# Patient Record
Sex: FEMALE | Race: WHITE | NOT HISPANIC OR LATINO | Employment: FULL TIME | URBAN - METROPOLITAN AREA
[De-identification: names, ages, dates, MRNs, and addresses within clinical notes are randomized per-mention and may not be internally consistent; named-entity substitution may affect disease eponyms.]

---

## 2017-08-24 DIAGNOSIS — Z30.41 ORAL CONTRACEPTIVE PILL SURVEILLANCE: ICD-10-CM

## 2017-08-24 RX ORDER — LEVONORGESTREL AND ETHINYL ESTRADIOL AND ETHINYL ESTRADIOL 150-30(84)
KIT ORAL
Refills: 3 | OUTPATIENT
Start: 2017-08-24

## 2017-08-29 DIAGNOSIS — Z30.41 ORAL CONTRACEPTIVE PILL SURVEILLANCE: ICD-10-CM

## 2017-08-29 RX ORDER — LEVONORGESTREL AND ETHINYL ESTRADIOL AND ETHINYL ESTRADIOL 150-30(84)
KIT ORAL
Refills: 3 | OUTPATIENT
Start: 2017-08-29

## 2017-08-29 RX ORDER — LEVONORGESTREL / ETHINYL ESTRADIOL AND ETHINYL ESTRADIOL 150-30(84)
1 KIT ORAL DAILY
Qty: 91 EACH | Refills: 0 | Status: SHIPPED | OUTPATIENT
Start: 2017-08-29 | End: 2017-10-10 | Stop reason: SDUPTHER

## 2017-08-29 NOTE — TELEPHONE ENCOUNTER
Patient called to schedule an annual exam, so I will send in prescription for her birth control pills (1 pack only).

## 2017-10-10 ENCOUNTER — OFFICE VISIT (OUTPATIENT)
Dept: OBSTETRICS AND GYNECOLOGY | Facility: CLINIC | Age: 27
End: 2017-10-10

## 2017-10-10 VITALS
SYSTOLIC BLOOD PRESSURE: 110 MMHG | HEIGHT: 62 IN | WEIGHT: 144.8 LBS | DIASTOLIC BLOOD PRESSURE: 80 MMHG | BODY MASS INDEX: 26.65 KG/M2

## 2017-10-10 DIAGNOSIS — G43.709 CHRONIC MIGRAINE WITHOUT AURA WITHOUT STATUS MIGRAINOSUS, NOT INTRACTABLE: ICD-10-CM

## 2017-10-10 DIAGNOSIS — Z30.41 ORAL CONTRACEPTIVE PILL SURVEILLANCE: Primary | ICD-10-CM

## 2017-10-10 DIAGNOSIS — N60.12 FIBROCYSTIC BREAST CHANGES, BILATERAL: ICD-10-CM

## 2017-10-10 DIAGNOSIS — N60.11 FIBROCYSTIC BREAST CHANGES, BILATERAL: ICD-10-CM

## 2017-10-10 DIAGNOSIS — Z01.419 ENCOUNTER FOR GYNECOLOGICAL EXAMINATION WITHOUT ABNORMAL FINDING: ICD-10-CM

## 2017-10-10 PROCEDURE — 99395 PREV VISIT EST AGE 18-39: CPT | Performed by: OBSTETRICS & GYNECOLOGY

## 2017-10-10 RX ORDER — LEVONORGESTREL / ETHINYL ESTRADIOL AND ETHINYL ESTRADIOL 150-30(84)
1 KIT ORAL DAILY
Qty: 91 EACH | Refills: 3 | Status: SHIPPED | OUTPATIENT
Start: 2017-10-10 | End: 2018-10-10

## 2017-10-10 NOTE — PROGRESS NOTES
Chief Complaint   Patient presents with   • Gynecologic Exam   • Med Refill       nAette Linton is a 27 y.o. year old  presenting to be seen for her annual exam.This patient has a history of severe migraine headaches which are not intractable.  She is followed by Dr. Enrique Noland.  She takes Topamax, 100 mg daily.  She has been treated with Seasonique oral contraceptives and has menses every 3 months.  Dr. Noland is aware that the patient is on oral contraceptives.  She has had no worsening of her headaches.  She has no other side effects.  She has previously had a diagnostic laparoscopy with laser ablation of endometriosis 2 years ago.    SCREENING TESTS    Year 2012   Age                         PAP   Neg. Neg. Neg.                    HPV high risk                         Mammogram                         XOCHITL score                         Breast MRI                         Lipids                         Vitamin D                         Colonoscopy                         DEXA  Frax (hip/any)                         Ovarian Screen                             She exercises regularly: yes.  She wears her seat belt: yes.  She has concerns about domestic violence: no.  She has noticed changes in height: no    GYN screening history:  · Last pap: was done on approximately 2016 and the result was: normal PAP..    No Additional Complaints Reported    The following portions of the patient's history were reviewed and updated as appropriate:vital signs and   She  does not have any pertinent problems on file.  She  has a past surgical history that includes Pelvic laparoscopy.  Her family history includes Diabetes in her father; Hypertension in her father; Lung cancer in her maternal grandfather; Mitral valve prolapse in her brother and mother.  She  reports that she has never smoked. She has never used  "smokeless tobacco. She reports that she drinks alcohol. She reports that she does not use illicit drugs.  Current Outpatient Prescriptions   Medication Sig Dispense Refill   • Levonorgest-Eth Estrad 91-Day (SEASONIQUE) 0.15-0.03 &0.01 MG tablet Take 1 tablet by mouth Daily. 91 each 3   • topiramate (TOPAMAX) 100 MG tablet   0     No current facility-administered medications for this visit.      She has No Known Allergies..    Review of Systems  A comprehensive review of systems was taken.  Constitutional: negative for fever, chills, activity change, appetite change, fatigue and unexpected weight change. Migraine headaches.  Respiratory: negative  Cardiovascular: negative  Gastrointestinal: negative  Genitourinary:negative  Musculoskeletal:negative  Behavioral/Psych: negative       /80  Ht 62\" (157.5 cm)  Wt 144 lb 12.8 oz (65.7 kg)  LMP 09/12/2017 (Exact Date)  BMI 26.48 kg/m2    Physical Exam    General:  alert; cooperative; well developed; well nourished   Skin:  No suspicious lesions seen   Thyroid: normal to inspection and palpation   Lungs:  clear to auscultation bilaterally   Heart:  regular rate and rhythm, S1, S2 normal, no murmur, click, rub or gallop   Breasts:  Examined in supine position  Symmetric without masses or skin dimpling  Nipples normal without inversion, lesions or discharge  There are no palpable axillary nodes  Fibrocystic changes are present both breasts without a discrete mass   Abdomen: soft, non-tender; no masses  no umbilical or inginual hernias are present  no hepato-splenomegaly   Pelvis: Clinical staff was present for exam  External genitalia:  normal appearance of the external genitalia including Bartholin's and [place]'s glands.  Vaginal:  normal pink mucosa without prolapse or lesions.  Cervix:  normal appearance.  Uterus:  normal size, shape and consistency.  Adnexa:  normal bimanual exam of the adnexa.  Rectal:  anus visually normal appearing. recto-vaginal exam " unremarkable and confirms findings;     Lab Review   No data reviewed    Imaging  No data reviewed         ASSESSMENT  Problems Addressed this Visit        Cardiovascular and Mediastinum    Migraine       Other    Oral contraceptive pill surveillance - Primary    Relevant Medications    Levonorgest-Eth Estrad 91-Day (SEASONIQUE) 0.15-0.03 &0.01 MG tablet    Fibrocystic breast changes, bilateral      Other Visit Diagnoses     Encounter for gynecological examination without abnormal finding        Relevant Orders    Liquid-based Pap Smear, Screening - ThinPrep Vial, Cervix, Endocervix          PLAN    Medications prescribed this encounter:    New Medications Ordered This Visit   Medications   • Levonorgest-Eth Estrad 91-Day (SEASONIQUE) 0.15-0.03 &0.01 MG tablet     Sig: Take 1 tablet by mouth Daily.     Dispense:  91 each     Refill:  3   · The patient was cautioned to call us should her migraine headaches worsen or should she develop pelvic pain.  · Calcium, 600 mg/ Vit. D, 400 IU daily; regular weight-bearing exercise  · Follow up: 12 month(s)  *Please note that portions of this documentation may have been completed with a voice recognition program.  Efforts were made to edit this dictation, but occasional words may have been mistranscribed.       This note was electronically signed.    REMINGTON Obrien MD  October 10, 2017  10:05 AM

## 2019-01-29 ENCOUNTER — OFFICE VISIT (OUTPATIENT)
Dept: OBSTETRICS AND GYNECOLOGY | Facility: CLINIC | Age: 29
End: 2019-01-29

## 2019-01-29 VITALS
HEIGHT: 62 IN | DIASTOLIC BLOOD PRESSURE: 70 MMHG | WEIGHT: 152.2 LBS | BODY MASS INDEX: 28.01 KG/M2 | SYSTOLIC BLOOD PRESSURE: 108 MMHG

## 2019-01-29 DIAGNOSIS — N60.12 FIBROCYSTIC BREAST CHANGES, BILATERAL: ICD-10-CM

## 2019-01-29 DIAGNOSIS — N60.11 FIBROCYSTIC BREAST CHANGES, BILATERAL: ICD-10-CM

## 2019-01-29 DIAGNOSIS — Z01.419 ENCOUNTER FOR GYNECOLOGICAL EXAMINATION WITHOUT ABNORMAL FINDING: Primary | ICD-10-CM

## 2019-01-29 PROCEDURE — 99395 PREV VISIT EST AGE 18-39: CPT | Performed by: OBSTETRICS & GYNECOLOGY

## 2019-01-29 NOTE — PROGRESS NOTES
Chief Complaint   Patient presents with   • Gynecologic Exam   • Endometriosis     patient has gotten  and is concerned about getting pregnant.       Anette Linton is a 29 y.o. year old  presenting to be seen for her annual exam.  This patient has a history of endometriosis diagnosed at laparoscopy.  She was taking oral contraceptives but since her marriage she has discontinued the pill.  She is using no method of contraception.  She has concerns about being able to conceive.  She has mild to moderate dysmenorrhea and has some premenstrual edema.  She is taking a prenatal vitamin daily.  She denies bowel or urinary symptoms.    SCREENING TESTS    Year 2012   Age                         PAP      Neg.                   HPV high risk                         Mammogram                         XOCHITL score                         Breast MRI                         Lipids                         Vitamin D                         Colonoscopy                         DEXA  Frax (hip/any)                         Ovarian Screen                             She exercises regularly: yes.  She wears her seat belt: yes.  She has concerns about domestic violence: no.  She has noticed changes in height: no    GYN screening history:  · Last pap: was done on approximately 10/10/2017 and the result was: normal PAP..    No Additional Complaints Reported    The following portions of the patient's history were reviewed and updated as appropriate:vital signs and   She  has a past medical history of Endometriosis, Migraine, and Ovarian cyst.  She does not have any pertinent problems on file.  She  has a past surgical history that includes Pelvic laparoscopy.  Her family history includes Diabetes in her father; Hypertension in her father; Lung cancer in her maternal grandfather; Mitral valve prolapse in her brother and  "mother.  She  reports that  has never smoked. she has never used smokeless tobacco. She reports that she drinks alcohol. She reports that she does not use drugs.  No current outpatient medications on file.     No current facility-administered medications for this visit.      She has No Known Allergies..    Review of Systems  A comprehensive review of systems was taken.  Constitutional: negative for fever, chills, activity change, appetite change, fatigue and unexpected weight change.  Respiratory: negative  Cardiovascular: negative  Gastrointestinal: negative  Genitourinary:negative  Musculoskeletal:negative  Behavioral/Psych: negative       /70   Ht 157.5 cm (62\")   Wt 69 kg (152 lb 3.2 oz)   LMP 01/01/2019 (Approximate)   BMI 27.84 kg/m²     Physical Exam    General:  alert; cooperative; well developed; well nourished   Skin:  No suspicious lesions seen   Thyroid: normal to inspection and palpation   Lungs:  clear to auscultation bilaterally   Heart:  regular rate and rhythm, S1, S2 normal, no murmur, click, rub or gallop   Breasts:  Examined in supine position  Symmetric without masses or skin dimpling  Nipples normal without inversion, lesions or discharge  There are no palpable axillary nodes  Fibrocystic changes are present both breasts without a discrete mass   Abdomen: soft, non-tender; no masses  no umbilical or inguinal hernias are present  no hepato-splenomegaly   Pelvis: Clinical staff was present for exam  External genitalia:  normal appearance of the external genitalia including Bartholin's and [place]'s glands.  Vaginal:  normal pink mucosa without prolapse or lesions.  Cervix:  normal appearance.  Uterus:  normal size, shape and consistency. anteverted;  Adnexa:  normal bimanual exam of the adnexa.  Rectal:  anus visually normal appearing. recto-vaginal exam unremarkable and confirms findings;     Lab Review   No data reviewed    Imaging  No data reviewed         ASSESSMENT  Problems " Addressed this Visit        Other    Fibrocystic breast changes, bilateral      Other Visit Diagnoses     Encounter for gynecological examination without abnormal finding    -  Primary    Relevant Orders    Liquid-based Pap Smear, Screening          PLAN    · Medications prescribed this encounter:  No orders of the defined types were placed in this encounter.  · Monthly self breast assessment  · Regular weight-bearing exercise  · Prenatal vitamins daily  · I have instructed the patient that if she is unable to conceive after 6 months she should return and we will consider repeat laparoscopy and tubal chromotubation.  · Follow up: 12 month(s)  *Please note that portions of this documentation may have been completed with a voice recognition program.  Efforts were made to edit this dictation, but occasional words may have been mistranscribed.       This note was electronically signed.    REMINGTON Obrien MD  January 29, 2019  3:39 PM

## 2019-07-02 ENCOUNTER — OFFICE VISIT (OUTPATIENT)
Dept: OBSTETRICS AND GYNECOLOGY | Facility: CLINIC | Age: 29
End: 2019-07-02

## 2019-07-02 VITALS
HEIGHT: 62 IN | DIASTOLIC BLOOD PRESSURE: 74 MMHG | SYSTOLIC BLOOD PRESSURE: 106 MMHG | WEIGHT: 156.8 LBS | BODY MASS INDEX: 28.85 KG/M2

## 2019-07-02 DIAGNOSIS — N60.11 FIBROCYSTIC BREAST CHANGES, BILATERAL: Primary | ICD-10-CM

## 2019-07-02 DIAGNOSIS — N60.12 FIBROCYSTIC BREAST CHANGES, BILATERAL: Primary | ICD-10-CM

## 2019-07-02 DIAGNOSIS — N94.5 SECONDARY DYSMENORRHEA: ICD-10-CM

## 2019-07-02 DIAGNOSIS — N80.9 ENDOMETRIOSIS: ICD-10-CM

## 2019-07-02 PROCEDURE — 99213 OFFICE O/P EST LOW 20 MIN: CPT | Performed by: OBSTETRICS & GYNECOLOGY

## 2019-07-02 NOTE — PROGRESS NOTES
Chief Complaint   Patient presents with   • Pelvic Pain     bloating, nausea, SOB X 1 month.  pain improved with rest.       Anette Linton is a 29 y.o. year old  presenting to be seen because of increasing pelvic pain and dysmenorrhea.  This patient has been laparoscoped twice.  In 2016 she underwent a robotically-assisted laparoscopy with peritoneal biopsies and laser ablation of endometriosis.  She had endometrial implants of the peritoneum and ovaries as well as bilateral periovarian adhesions.  She had improvement after that time but since discontinuing her birth control pills in February has noted increasing dysmenorrhea, pelvic pain, bloating, and nausea.  Her last menstrual period began 2019 and was normal.  She denies bowel or urinary symptoms.  She does have some pelvic pain with exercise.    Social History     Substance and Sexual Activity   Sexual Activity Yes   • Partners: Male   • Birth control/protection: None     SCREENING TESTS    Year 2012   Age                         PAP       Neg. Neg.                 HPV high risk                         Mammogram                         XOCHITL score                         Breast MRI                         Lipids                         Vitamin D                         Colonoscopy                         DEXA  Frax (hip/any)                         Ovarian Screen                             No Additional Complaints Reported    The following portions of the patient's history were reviewed and updated as appropriate:vital signs and   She  has a past medical history of Endometriosis, Migraine, and Ovarian cyst.  She does not have any pertinent problems on file.  She  has a past surgical history that includes Pelvic laparoscopy.  Her family history includes Diabetes in her father; Hypertension in her father; Lung cancer in her maternal  "grandfather; Mitral valve prolapse in her brother and mother.  She  reports that she has never smoked. She has never used smokeless tobacco. She reports that she drinks alcohol. She reports that she does not use drugs.  No current outpatient medications on file.     No current facility-administered medications for this visit.      She has No Known Allergies..        Review of Systems  A comprehensive review of systems was done.  Constitutional: negative for fever, chills, activity change, appetite change, fatigue and unexpected weight change.  Respiratory: negative  Cardiovascular: negative  Gastrointestinal: negative  Genitourinary:positive for dysmenorrhea  Musculoskeletal:negative  Behavioral/Psych: negative          /74   Ht 157.5 cm (62\")   Wt 71.1 kg (156 lb 12.8 oz)   LMP 06/29/2019 (Exact Date)   Breastfeeding? No   BMI 28.68 kg/m²     Physical Exam    General:  alert; cooperative; well developed; well nourished   Skin:  No suspicious lesions seen   Thyroid: normal to inspection and palpation   Lungs:  clear to auscultation bilaterally   Heart:  regular rate and rhythm, S1, S2 normal, no murmur, click, rub or gallop   Breasts:  Not performed.   Abdomen: soft, non-tender; no masses  no umbilical or inguinal hernias are present  no hepato-splenomegaly   Pelvis: Clinical staff was present for exam  External genitalia:  normal appearance of the external genitalia including Bartholin's and [place]'s glands.  Vaginal:  normal pink mucosa without prolapse or lesions.  Cervix:  normal appearance.  Uterus:  normal size, shape and consistency. retroverted; tender  Adnexa:  normal bimanual exam of the adnexa.  Rectal:  anus visually normal appearing. recto-vaginal exam unremarkable and confirms findings;     Lab Review   No data reviewed    Imaging   No data reviewed    Medical counseling was given to patient for the following topics: diagnostic results, instructions for management, risk factor reductions, " prognosis, impressions and risks and benefits of treatment options . Total time of the encounter was 23 minute(s) and 14 minute(s)  was spent in face to face counseling.  I have counseled the patient that since her dysmenorrhea and pelvic pain has increased after discontinuing oral contraceptives, I would recommend that she have repeat laparoscopy since she will be attempting to conceive.  I have counseled the patient that she needs a follow-up pelvic ultrasound.  I have reviewed with her laparoscopy including laser ablation, lysis of adhesions, and chromotubation.  She has had no problems with her prior laparoscopies.  I have discussed with her the effects of endometriosis on fertility.  I have counseled her to continue taking a prenatal vitamin daily.          ASSESSMENT  Problems Addressed this Visit        Nervous and Auditory    Secondary dysmenorrhea       Other    Endometriosis    Relevant Orders    US Non-ob Transvaginal    Fibrocystic breast changes, bilateral - Primary            PLAN    · Medications prescribed this encounter:  No orders of the defined types were placed in this encounter.  · Return for pelvic ultrasound on 1/10/2019.  Continue taking prenatal vitamin daily  · Follow up: 8 day(s)  · PNV daily     *Please note that portions of this documentation may have been completed with a voice recognition program.  Efforts were made to edit this dictation, but occasional words may have been mistranscribed.     This note was electronically signed.    REMINGTON Obrien MD  July 2, 2019  10:21 AM

## 2019-07-10 ENCOUNTER — OFFICE VISIT (OUTPATIENT)
Dept: OBSTETRICS AND GYNECOLOGY | Facility: CLINIC | Age: 29
End: 2019-07-10

## 2019-07-10 VITALS
BODY MASS INDEX: 28.71 KG/M2 | WEIGHT: 156 LBS | HEIGHT: 62 IN | SYSTOLIC BLOOD PRESSURE: 106 MMHG | DIASTOLIC BLOOD PRESSURE: 74 MMHG

## 2019-07-10 DIAGNOSIS — N94.5 SECONDARY DYSMENORRHEA: Primary | ICD-10-CM

## 2019-07-10 DIAGNOSIS — N80.9 ENDOMETRIOSIS: ICD-10-CM

## 2019-07-10 PROCEDURE — 99213 OFFICE O/P EST LOW 20 MIN: CPT | Performed by: OBSTETRICS & GYNECOLOGY

## 2019-07-10 NOTE — PROGRESS NOTES
Chief Complaint   Patient presents with   • Results     pelvic ultrasound today       Anette Linton is a 29 y.o. year old  presenting to be seen because of follow-up for a history of endometriosis resulting in secondary dysmenorrhea.  In 2016 this patient underwent a robotically-assisted laparoscopy with biopsies of peritoneal and ovarian endometriosis as well as lysis of adhesions.  She was suppressed with oral contraceptives until February of this year and had relief of dysmenorrhea.  She discontinued oral contraceptives in 2019 and has resumed dysmenorrhea.  She is having a pelvic ultrasound today.  She denies bowel or urinary symptoms.  She is taking a prenatal vitamin daily.  She is attempting to conceive.    Social History     Substance and Sexual Activity   Sexual Activity Yes   • Partners: Male   • Birth control/protection: None     SCREENING TESTS    Year 2012   Age                         PAP        Neg.                 HPV high risk                         Mammogram                         XOCHITL score                         Breast MRI                         Lipids                         Vitamin D                         Colonoscopy                         DEXA  Frax (hip/any)                         Ovarian Screen                             No Additional Complaints Reported    The following portions of the patient's history were reviewed and updated as appropriate:vital signs and   She  has a past medical history of Endometriosis, Migraine, and Ovarian cyst.  She does not have any pertinent problems on file.  She  has a past surgical history that includes Pelvic laparoscopy.  Her family history includes Diabetes in her father; Hypertension in her father; Lung cancer in her maternal grandfather; Mitral valve prolapse in her brother and mother.  She  reports that she has never  "smoked. She has never used smokeless tobacco. She reports that she drinks alcohol. She reports that she does not use drugs.  No current outpatient medications on file.     No current facility-administered medications for this visit.      She has No Known Allergies..        Review of Systems  A comprehensive review of systems was done.  Constitutional: negative for fever, chills, activity change, appetite change, fatigue and unexpected weight change.  Respiratory: negative  Cardiovascular: negative  Gastrointestinal: negative  Genitourinary:negative  Musculoskeletal:negative  Behavioral/Psych: negative          /74   Ht 157.5 cm (62\")   Wt 70.8 kg (156 lb)   LMP 06/29/2019 (Exact Date)   BMI 28.53 kg/m²     Physical Exam    General:  alert; cooperative; well developed; well nourished   Skin:  Not performed.   Thyroid: normal to inspection and palpation   Lungs:  clear to auscultation bilaterally   Heart:  regular rate and rhythm, S1, S2 normal, no murmur, click, rub or gallop   Breasts:  Not performed.   Abdomen: soft, non-tender; no masses  no umbilical or inguinal hernias are present  no hepato-splenomegaly   Pelvis: Not performed.     Lab Review   Pap results- negative    Imaging   Pelvic ultrasound results-reveals a normal size anteverted uterus with an endometrial stripe of 8.5 mm.  The right ovary is normal.  There is a leading follicle in the left ovary.  This is consistent with pre-ovulation.    Medical counseling was given to patient and mother for the following topics: diagnostic results, instructions for management, prognosis, impressions and risks and benefits of treatment options . Total time of the encounter was 19 minute(s) and 17 minute(s)  was spent in face to face counseling.  I have counseled the patient that her pelvic ultrasound is normal and is consistent with ovulation.  I have counseled her that there is no evidence of an endometrioma.  I have counseled her that she may use " nonsteroidal anti-inflammatory agents for dysmenorrhea.  I have counseled her to decrease salt intake since she is having some swelling after her menses.  I have advised her that I would prefer not to use spironolactone.  I have counseled her to continue her prenatal vitamin daily.  I have counseled the patient to use an ovulation indicator kit.  I have counseled her about timed intercourse.          ASSESSMENT  Problems Addressed this Visit        Nervous and Auditory    Secondary dysmenorrhea - Primary       Other    Endometriosis            PLAN    · Medications prescribed this encounter:  No orders of the defined types were placed in this encounter.  · Use of ovulation indicator kit  · Continue prenatal vitamins daily  · If they are unable to conceive she will return in late October for laboratory evaluation and scheduling of a semen analysis.  · Follow up: 6 month(s)  · PNV daily     *Please note that portions of this documentation may have been completed with a voice recognition program.  Efforts were made to edit this dictation, but occasional words may have been mistranscribed.     This note was electronically signed.    REMINGTON Obrien MD  July 10, 2019  10:56 AM

## 2019-08-24 PROBLEM — Z34.00 SUPERVISION OF NORMAL FIRST PREGNANCY: Status: ACTIVE | Noted: 2019-08-24

## 2019-08-24 PROBLEM — Z01.419 WELL WOMAN EXAM: Status: ACTIVE | Noted: 2019-08-24

## 2019-10-15 ENCOUNTER — LAB REQUISITION (OUTPATIENT)
Dept: LAB | Facility: HOSPITAL | Age: 29
End: 2019-10-15

## 2019-10-15 DIAGNOSIS — Z00.00 ROUTINE GENERAL MEDICAL EXAMINATION AT A HEALTH CARE FACILITY: ICD-10-CM

## 2019-10-15 PROCEDURE — 36415 COLL VENOUS BLD VENIPUNCTURE: CPT | Performed by: OBSTETRICS & GYNECOLOGY

## 2019-10-21 ENCOUNTER — TRANSCRIBE ORDERS (OUTPATIENT)
Dept: WOMENS IMAGING | Facility: HOSPITAL | Age: 29
End: 2019-10-21

## 2019-10-21 DIAGNOSIS — O28.5 ABNORMAL CHROMOSOMAL AND GENETIC FINDING ON ANTENATAL SCREENING MOTHER: Primary | ICD-10-CM

## 2019-10-24 ENCOUNTER — HOSPITAL ENCOUNTER (OUTPATIENT)
Dept: WOMENS IMAGING | Facility: HOSPITAL | Age: 29
Discharge: HOME | End: 2019-10-24
Admitting: OBSTETRICS & GYNECOLOGY

## 2019-10-24 ENCOUNTER — OFFICE VISIT (OUTPATIENT)
Dept: OBSTETRICS AND GYNECOLOGY | Facility: HOSPITAL | Age: 29
End: 2019-10-24

## 2019-10-24 VITALS
BODY MASS INDEX: 30.62 KG/M2 | HEIGHT: 61 IN | WEIGHT: 162.2 LBS | DIASTOLIC BLOOD PRESSURE: 64 MMHG | SYSTOLIC BLOOD PRESSURE: 108 MMHG

## 2019-10-24 DIAGNOSIS — O28.5 ABNORMAL CHROMOSOMAL AND GENETIC FINDING ON ANTENATAL SCREENING MOTHER: ICD-10-CM

## 2019-10-24 DIAGNOSIS — Z34.90 PREGNANCY, UNSPECIFIED GESTATIONAL AGE: ICD-10-CM

## 2019-10-24 DIAGNOSIS — O28.0 ABNORMAL MSAFP (MATERNAL SERUM ALPHA-FETOPROTEIN), ELEVATED: Primary | ICD-10-CM

## 2019-10-24 DIAGNOSIS — O44.20 MARGINAL PLACENTA PREVIA: ICD-10-CM

## 2019-10-24 PROCEDURE — 76811 OB US DETAILED SNGL FETUS: CPT | Performed by: OBSTETRICS & GYNECOLOGY

## 2019-10-24 PROCEDURE — 76811 OB US DETAILED SNGL FETUS: CPT

## 2019-10-24 PROCEDURE — 99241 PR OFFICE CONSULTATION NEW/ESTAB PATIENT 15 MIN: CPT | Performed by: OBSTETRICS & GYNECOLOGY

## 2019-10-24 RX ORDER — PRENATAL WITH FERROUS FUM AND FOLIC ACID 3080; 920; 120; 400; 22; 1.84; 3; 20; 10; 1; 12; 200; 27; 25; 2 [IU]/1; [IU]/1; MG/1; [IU]/1; MG/1; MG/1; MG/1; MG/1; MG/1; MG/1; UG/1; MG/1; MG/1; MG/1; MG/1
1 TABLET ORAL DAILY
COMMUNITY
End: 2020-02-21 | Stop reason: HOSPADM

## 2020-01-03 ENCOUNTER — LAB REQUISITION (OUTPATIENT)
Dept: LAB | Facility: HOSPITAL | Age: 30
End: 2020-01-03

## 2020-01-03 DIAGNOSIS — Z00.00 ROUTINE GENERAL MEDICAL EXAMINATION AT A HEALTH CARE FACILITY: ICD-10-CM

## 2020-01-03 PROCEDURE — 36415 COLL VENOUS BLD VENIPUNCTURE: CPT | Performed by: NURSE PRACTITIONER

## 2020-02-06 ENCOUNTER — APPOINTMENT (OUTPATIENT)
Dept: WOMENS IMAGING | Facility: HOSPITAL | Age: 30
End: 2020-02-06

## 2020-02-11 ENCOUNTER — LAB REQUISITION (OUTPATIENT)
Dept: LAB | Facility: HOSPITAL | Age: 30
End: 2020-02-11

## 2020-02-11 DIAGNOSIS — Z00.00 ROUTINE GENERAL MEDICAL EXAMINATION AT A HEALTH CARE FACILITY: ICD-10-CM

## 2020-02-11 PROCEDURE — 36415 COLL VENOUS BLD VENIPUNCTURE: CPT | Performed by: NURSE PRACTITIONER

## 2020-02-14 ENCOUNTER — LAB (OUTPATIENT)
Dept: LAB | Facility: HOSPITAL | Age: 30
End: 2020-02-14

## 2020-02-14 ENCOUNTER — HOSPITAL ENCOUNTER (INPATIENT)
Facility: HOSPITAL | Age: 30
LOS: 7 days | Discharge: HOME | End: 2020-02-21
Attending: OBSTETRICS & GYNECOLOGY | Admitting: OBSTETRICS & GYNECOLOGY

## 2020-02-14 ENCOUNTER — TRANSCRIBE ORDERS (OUTPATIENT)
Dept: LAB | Facility: HOSPITAL | Age: 30
End: 2020-02-14

## 2020-02-14 DIAGNOSIS — Z36.0 SCREENING FOR CHROMOSOMAL ANOMALIES BY AMNIOCENTESIS: ICD-10-CM

## 2020-02-14 DIAGNOSIS — Z34.90 PREGNANCY, UNSPECIFIED GESTATIONAL AGE: Primary | ICD-10-CM

## 2020-02-14 DIAGNOSIS — O14.93 PRE-ECLAMPSIA IN THIRD TRIMESTER: Primary | ICD-10-CM

## 2020-02-14 PROBLEM — O14.90 PREECLAMPSIA: Status: ACTIVE | Noted: 2020-02-14

## 2020-02-14 LAB
ABO GROUP BLD: NORMAL
ALP SERPL-CCNC: 163 U/L (ref 39–117)
ALT SERPL W P-5'-P-CCNC: 23 U/L (ref 1–33)
AST SERPL-CCNC: 29 U/L (ref 1–32)
BILIRUB SERPL-MCNC: 0.3 MG/DL (ref 0.2–1.2)
BLD GP AB SCN SERPL QL: NEGATIVE
CREAT BLD-MCNC: 0.72 MG/DL (ref 0.57–1)
CREAT UR-MCNC: 55.5 MG/DL
DEPRECATED RDW RBC AUTO: 39.9 FL (ref 37–54)
ERYTHROCYTE [DISTWIDTH] IN BLOOD BY AUTOMATED COUNT: 12 % (ref 12.3–15.4)
HCT VFR BLD AUTO: 37 % (ref 34–46.6)
HGB BLD-MCNC: 12.9 G/DL (ref 12–15.9)
LDH SERPL-CCNC: 273 U/L (ref 135–214)
MCH RBC QN AUTO: 32 PG (ref 26.6–33)
MCHC RBC AUTO-ENTMCNC: 34.9 G/DL (ref 31.5–35.7)
MCV RBC AUTO: 91.8 FL (ref 79–97)
PLATELET # BLD AUTO: 267 10*3/MM3 (ref 140–450)
PMV BLD AUTO: 10.8 FL (ref 6–12)
PROT 24H UR-MRATE: 3177.5 MG/24HOURS (ref 0–150)
PROT UR-MCNC: 149 MG/DL
PROT/CREAT UR: 2684.7 MG/G CREA (ref 0–200)
RBC # BLD AUTO: 4.03 10*6/MM3 (ref 3.77–5.28)
RH BLD: POSITIVE
T&S EXPIRATION DATE: NORMAL
URATE SERPL-MCNC: 5 MG/DL (ref 2.4–5.7)
WBC NRBC COR # BLD: 13.9 10*3/MM3 (ref 3.4–10.8)

## 2020-02-14 PROCEDURE — 86850 RBC ANTIBODY SCREEN: CPT | Performed by: NURSE PRACTITIONER

## 2020-02-14 PROCEDURE — 84450 TRANSFERASE (AST) (SGOT): CPT | Performed by: NURSE PRACTITIONER

## 2020-02-14 PROCEDURE — 59025 FETAL NON-STRESS TEST: CPT

## 2020-02-14 PROCEDURE — 82565 ASSAY OF CREATININE: CPT | Performed by: NURSE PRACTITIONER

## 2020-02-14 PROCEDURE — 86900 BLOOD TYPING SEROLOGIC ABO: CPT | Performed by: NURSE PRACTITIONER

## 2020-02-14 PROCEDURE — 82570 ASSAY OF URINE CREATININE: CPT

## 2020-02-14 PROCEDURE — 84156 ASSAY OF PROTEIN URINE: CPT

## 2020-02-14 PROCEDURE — 84550 ASSAY OF BLOOD/URIC ACID: CPT | Performed by: NURSE PRACTITIONER

## 2020-02-14 PROCEDURE — 86900 BLOOD TYPING SEROLOGIC ABO: CPT

## 2020-02-14 PROCEDURE — 86901 BLOOD TYPING SEROLOGIC RH(D): CPT | Performed by: NURSE PRACTITIONER

## 2020-02-14 PROCEDURE — 82247 BILIRUBIN TOTAL: CPT | Performed by: NURSE PRACTITIONER

## 2020-02-14 PROCEDURE — 25010000002 BETAMETHASONE ACET & SOD PHOS PER 4 MG: Performed by: NURSE PRACTITIONER

## 2020-02-14 PROCEDURE — 36415 COLL VENOUS BLD VENIPUNCTURE: CPT | Performed by: NURSE PRACTITIONER

## 2020-02-14 PROCEDURE — 87081 CULTURE SCREEN ONLY: CPT

## 2020-02-14 PROCEDURE — 86901 BLOOD TYPING SEROLOGIC RH(D): CPT

## 2020-02-14 PROCEDURE — 83615 LACTATE (LD) (LDH) ENZYME: CPT | Performed by: NURSE PRACTITIONER

## 2020-02-14 PROCEDURE — 85027 COMPLETE CBC AUTOMATED: CPT | Performed by: NURSE PRACTITIONER

## 2020-02-14 PROCEDURE — 81050 URINALYSIS VOLUME MEASURE: CPT | Performed by: NURSE PRACTITIONER

## 2020-02-14 PROCEDURE — 84075 ASSAY ALKALINE PHOSPHATASE: CPT | Performed by: NURSE PRACTITIONER

## 2020-02-14 PROCEDURE — 84156 ASSAY OF PROTEIN URINE: CPT | Performed by: NURSE PRACTITIONER

## 2020-02-14 PROCEDURE — 84460 ALANINE AMINO (ALT) (SGPT): CPT | Performed by: NURSE PRACTITIONER

## 2020-02-14 RX ORDER — BETAMETHASONE SODIUM PHOSPHATE AND BETAMETHASONE ACETATE 3; 3 MG/ML; MG/ML
12 INJECTION, SUSPENSION INTRA-ARTICULAR; INTRALESIONAL; INTRAMUSCULAR; SOFT TISSUE EVERY 24 HOURS
Status: COMPLETED | OUTPATIENT
Start: 2020-02-14 | End: 2020-02-15

## 2020-02-14 RX ORDER — ONDANSETRON 4 MG/1
4 TABLET, FILM COATED ORAL EVERY 8 HOURS PRN
Status: DISCONTINUED | OUTPATIENT
Start: 2020-02-14 | End: 2020-02-18 | Stop reason: SDUPTHER

## 2020-02-14 RX ORDER — ACETAMINOPHEN 325 MG/1
650 TABLET ORAL EVERY 4 HOURS PRN
Status: DISCONTINUED | OUTPATIENT
Start: 2020-02-14 | End: 2020-02-21 | Stop reason: HOSPADM

## 2020-02-14 RX ADMIN — BETAMETHASONE SODIUM PHOSPHATE AND BETAMETHASONE ACETATE 12 MG: 3; 3 INJECTION, SUSPENSION INTRA-ARTICULAR; INTRALESIONAL; INTRAMUSCULAR at 15:58

## 2020-02-15 LAB
ALP SERPL-CCNC: 134 U/L (ref 39–117)
ALT SERPL W P-5'-P-CCNC: 18 U/L (ref 1–33)
AST SERPL-CCNC: 22 U/L (ref 1–32)
BILIRUB SERPL-MCNC: <0.2 MG/DL (ref 0.2–1.2)
CREAT BLD-MCNC: 0.59 MG/DL (ref 0.57–1)
DEPRECATED RDW RBC AUTO: 39.5 FL (ref 37–54)
ERYTHROCYTE [DISTWIDTH] IN BLOOD BY AUTOMATED COUNT: 12 % (ref 12.3–15.4)
HCT VFR BLD AUTO: 34.8 % (ref 34–46.6)
HGB BLD-MCNC: 11.9 G/DL (ref 12–15.9)
LDH SERPL-CCNC: 245 U/L (ref 135–214)
MCH RBC QN AUTO: 31.2 PG (ref 26.6–33)
MCHC RBC AUTO-ENTMCNC: 34.2 G/DL (ref 31.5–35.7)
MCV RBC AUTO: 91.1 FL (ref 79–97)
PLATELET # BLD AUTO: 238 10*3/MM3 (ref 140–450)
PMV BLD AUTO: 11.4 FL (ref 6–12)
RBC # BLD AUTO: 3.82 10*6/MM3 (ref 3.77–5.28)
URATE SERPL-MCNC: 4.9 MG/DL (ref 2.4–5.7)
WBC NRBC COR # BLD: 17.76 10*3/MM3 (ref 3.4–10.8)

## 2020-02-15 PROCEDURE — 25010000002 BETAMETHASONE ACET & SOD PHOS PER 4 MG: Performed by: NURSE PRACTITIONER

## 2020-02-15 PROCEDURE — 85027 COMPLETE CBC AUTOMATED: CPT | Performed by: OBSTETRICS & GYNECOLOGY

## 2020-02-15 PROCEDURE — 82565 ASSAY OF CREATININE: CPT | Performed by: OBSTETRICS & GYNECOLOGY

## 2020-02-15 PROCEDURE — 84460 ALANINE AMINO (ALT) (SGPT): CPT | Performed by: OBSTETRICS & GYNECOLOGY

## 2020-02-15 PROCEDURE — 59025 FETAL NON-STRESS TEST: CPT

## 2020-02-15 PROCEDURE — 84450 TRANSFERASE (AST) (SGOT): CPT | Performed by: OBSTETRICS & GYNECOLOGY

## 2020-02-15 PROCEDURE — 82247 BILIRUBIN TOTAL: CPT | Performed by: OBSTETRICS & GYNECOLOGY

## 2020-02-15 PROCEDURE — 83615 LACTATE (LD) (LDH) ENZYME: CPT | Performed by: OBSTETRICS & GYNECOLOGY

## 2020-02-15 PROCEDURE — 84550 ASSAY OF BLOOD/URIC ACID: CPT | Performed by: OBSTETRICS & GYNECOLOGY

## 2020-02-15 PROCEDURE — 84075 ASSAY ALKALINE PHOSPHATASE: CPT | Performed by: OBSTETRICS & GYNECOLOGY

## 2020-02-15 RX ORDER — FAMOTIDINE 20 MG/1
20 TABLET, FILM COATED ORAL
Status: DISCONTINUED | OUTPATIENT
Start: 2020-02-15 | End: 2020-02-21 | Stop reason: HOSPADM

## 2020-02-15 RX ORDER — FAMOTIDINE 20 MG/1
20 TABLET, FILM COATED ORAL
Status: DISCONTINUED | OUTPATIENT
Start: 2020-02-15 | End: 2020-02-15

## 2020-02-15 RX ADMIN — BETAMETHASONE SODIUM PHOSPHATE AND BETAMETHASONE ACETATE 12 MG: 3; 3 INJECTION, SUSPENSION INTRA-ARTICULAR; INTRALESIONAL; INTRAMUSCULAR at 15:14

## 2020-02-15 RX ADMIN — ACETAMINOPHEN 650 MG: 325 TABLET ORAL at 02:47

## 2020-02-15 RX ADMIN — FAMOTIDINE 20 MG: 20 TABLET ORAL at 01:54

## 2020-02-15 NOTE — PROGRESS NOTES
Daily Progress Note    Patient name: Anette Linton  YOB: 1990   MRN: [id number]  Admission Date: 2020  Date of Service: 2/15/2020  Referring Provider: Dariel Aragon MD    Anette Linton is a 30 y.o.    at 33w1d  admitted on 2020 for preeclampsia    Hospital day 1      Diagnoses:   Patient Active Problem List    Diagnosis   • Preeclampsia [O14.90]   • Abnormal MSAFP (maternal serum alpha-fetoprotein), elevated [O28.0]   • Pregnancy [Z34.90]   • Marginal placenta previa [O44.20]   • Annual GYN exam w/o problems [Z01.419]   • Supervision of normal first pregnancy [Z34.00]   • Migraine [G43.909]       Chief Complaint:  Chief Complaint   Patient presents with   • Hypertension-Pregnant       Subjective:      Anette has no complaints today and denies headache, vision change, SOA, or abdominal pain. BP has been normal overnight.   Reports fetal movement is normal  Denies leakage of amniotic fluid.  Denies vaginal bleeding    Objective:     Vital signs:  Temp:  [98.4 °F (36.9 °C)-99.2 °F (37.3 °C)] 98.4 °F (36.9 °C)  Heart Rate:  [62-86] 75  Resp:  [16-18] 18  BP: (126-152)/(73-94) 133/89    Abdomen: soft, nontender  Uterus: gravid, nontender  Extremities: nontender; no edema        Non-Stress Test:    Fetal Heart Rate Assessment   Method: Fetal HR Assessment Method: external   Beats/min: Fetal HR (beats/min): 120   Baseline: Fetal Heart Baseline Rate: normal range   Variability: Fetal HR Variability: moderate (amplitude range 6 to 25 bpm)   Accels: Fetal HR Accelerations: greater than/equal to 15 bpm, lasting at least 15 seconds   Decels: Fetal HR Decelerations: absent   Tracing Category:       Uterine Assessment   Method: Method: external tocotransducer   Frequency (min): Contraction Frequency (Minutes): x3   Ctx Count in 10 min:     Duration:     Intensity:     Intensity by IUPC:     Resting Tone: Uterine Resting Tone: soft by palpation(no ctx)   Resting Tone by IUPC:     [place] Units:        Cervix: Exam by:     Dilation:     Effacement:     Station:         Most recent ultrasound:      Medications:    betamethasone acetate-betamethasone sodium phosphate 12 mg Intramuscular Q24H   famotidine 20 mg Oral BID AC      •  acetaminophen  •  ondansetron    Labs:  Lab Results (last 24 hours)     Procedure Component Value Units Date/Time    Preeclampsia Panel [[id number]]  (Abnormal) Collected:  02/15/20 0512    Specimen:  Blood Updated:  02/15/20 0734     Alkaline Phosphatase 134 U/L      ALT (SGPT) 18 U/L      AST (SGOT) 22 U/L      Creatinine 0.59 mg/dL      Total Bilirubin <0.2 mg/dL       U/L      Uric Acid 4.9 mg/dL     CBC (No Diff) [[id number]]  (Abnormal) Collected:  02/15/20 0512    Specimen:  Blood Updated:  02/15/20 0623     WBC 17.76 10*3/mm3      RBC 3.82 10*6/mm3      Hemoglobin 11.9 g/dL      Hematocrit 34.8 %      MCV 91.1 fL      MCH 31.2 pg      MCHC 34.2 g/dL      RDW 12.0 %      RDW-SD 39.5 fl      MPV 11.4 fL      Platelets 238 10*3/mm3     Preeclampsia Panel [[id number]]  (Abnormal) Collected:  20    Specimen:  Blood Updated:  20 145     Alkaline Phosphatase 163 U/L      ALT (SGPT) 23 U/L      AST (SGOT) 29 U/L      Creatinine 0.72 mg/dL      Total Bilirubin 0.3 mg/dL       U/L      Uric Acid 5.0 mg/dL     CBC (No Diff) [[id number]]  (Abnormal) Collected:  20    Specimen:  Blood Updated:  20 142     WBC 13.90 10*3/mm3      RBC 4.03 10*6/mm3      Hemoglobin 12.9 g/dL      Hematocrit 37.0 %      MCV 91.8 fL      MCH 32.0 pg      MCHC 34.9 g/dL      RDW 12.0 %      RDW-SD 39.9 fl      MPV 10.8 fL      Platelets 267 10*3/mm3         Lab Results   Component Value Date    HGB 11.9 (L) 02/15/2020         Assessment/Plan:      Anette is a 30 y.o.    at 33w1d.  1. Preeclampsia- Will continue steroids and observation.  Plan for PDC scan on Monday with possible delivery.    .  All questions were answered to the best my ability.    Kwan Noland  MD Randy  2/15/2020

## 2020-02-15 NOTE — H&P
"History and Physical  [facility]    Chief Complaint   Patient presents with   • Hypertension-Pregnant       Patient Active Problem List   Diagnosis   • Migraine   • Annual GYN exam w/o problems   • Supervision of normal first pregnancy   • Abnormal MSAFP (maternal serum alpha-fetoprotein), elevated   • Pregnancy   • Marginal placenta previa   • Preeclampsia       Anette Linton is a 30 y.o. year old  with an Estimated Date of Delivery: 4/3/20 currently at 33w0d presenting with swelling and elevated blood pressure.     Prenatal care has been with Dr. Aragon.  It has been significant for swelling and elevated blood pressure. She was seen by Nurse Practitioner on Tuesday and started a 24 hour urine protein and labs. Labs were normal, but 3g of protein in urine. Currently she is otherwise without complaints.     No Additional Complaints Reported    The following portions of the patient's history were reviewed and updated as appropriate:vital signs, allergies, current medications, past medical history, past social history, past surgical history and problem list.    Review of Systems  Pertinent items are noted in HPI.     Objective     /84   Pulse 82   Temp 98.4 °F (36.9 °C) (Oral)   Resp 18   Ht 154.9 cm (61\")   Wt 91.6 kg (202 lb)   BMI 38.17 kg/m²     Physical Exam    General:  well developed; well nourished  no acute distress           Abdomen: soft, non-tender; no masses  no umbilical or inguinal hernias are present  no hepato-splenomegaly       FHT's: reactive and category 1   Cervix:    Labette:      Lab Review   Labs: CBC   Lab Results (last 24 hours)     Procedure Component Value Units Date/Time    Preeclampsia Panel [[id number]]  (Abnormal) Collected:  20 1417    Specimen:  Blood Updated:  20 1456     Alkaline Phosphatase 163 U/L      ALT (SGPT) 23 U/L      AST (SGOT) 29 U/L      Creatinine 0.72 mg/dL      Total Bilirubin 0.3 mg/dL       U/L      Uric Acid 5.0 " mg/dL     CBC (No Diff) [[id number]]  (Abnormal) Collected:  02/14/20 1417    Specimen:  Blood Updated:  02/14/20 1427     WBC 13.90 10*3/mm3      RBC 4.03 10*6/mm3      Hemoglobin 12.9 g/dL      Hematocrit 37.0 %      MCV 91.8 fL      MCH 32.0 pg      MCHC 34.9 g/dL      RDW 12.0 %      RDW-SD 39.9 fl      MPV 10.8 fL      Platelets 267 10*3/mm3           Imaging   No data reviewed   Imaging Results (Most Recent)     None        Assessment/Plan     ASSESSMENT  1. IUP at 33w0d  2. Preeclampsia  PLAN  1. Admit for steroids and monitoring of blood pressure. BP have been normal so far since admission.   2. Continue twice daily NSTs.          Kwan Padilla MD  2/14/20209:12 PM

## 2020-02-16 PROCEDURE — 59025 FETAL NON-STRESS TEST: CPT

## 2020-02-16 RX ADMIN — FAMOTIDINE 20 MG: 20 TABLET ORAL at 00:02

## 2020-02-16 RX ADMIN — FAMOTIDINE 20 MG: 20 TABLET ORAL at 07:58

## 2020-02-16 RX ADMIN — FAMOTIDINE 20 MG: 20 TABLET ORAL at 17:27

## 2020-02-16 NOTE — PROGRESS NOTES
Daily Progress Note    Patient name: Anette Linton  YOB: 1990   MRN: [id number]  Admission Date: 2020  Date of Service: 2020  Referring Provider: Dariel Aragon MD    Anette Linton is a 30 y.o.    at 33w2d  admitted on 2020 for preeclampsia    Hospital day 2      Diagnoses:   Patient Active Problem List    Diagnosis   • Preeclampsia [O14.90]   • Abnormal MSAFP (maternal serum alpha-fetoprotein), elevated [O28.0]   • Pregnancy [Z34.90]   • Marginal placenta previa [O44.20]   • Annual GYN exam w/o problems [Z01.419]   • Supervision of normal first pregnancy [Z34.00]   • Migraine [G43.909]       Chief Complaint:  Chief Complaint   Patient presents with   • Hypertension-Pregnant       Subjective:      Anette has no complaints today and is feeling well. Normal BP overnight.   Reports fetal movement is normal  Denies leakage of amniotic fluid.  Denies vaginal bleeding    Objective:     Vital signs:  Temp:  [98 °F (36.7 °C)-98.4 °F (36.9 °C)] 98 °F (36.7 °C)  Heart Rate:  [68-87] 87  Resp:  [16-18] 16  BP: (109-144)/(63-79) 109/63    Abdomen: soft, nontender  Uterus: gravid, nontender  Extremities: nontender; no edema        Non-Stress Test:    Fetal Heart Rate Assessment   Method: Fetal HR Assessment Method: external   Beats/min: Fetal HR (beats/min): 120   Baseline: Fetal Heart Baseline Rate: normal range   Variability: Fetal HR Variability: moderate (amplitude range 6 to 25 bpm)   Accels: Fetal HR Accelerations: greater than/equal to 15 bpm, lasting at least 15 seconds   Decels: Fetal HR Decelerations: absent   Tracing Category:       Uterine Assessment   Method: Method: external tocotransducer   Frequency (min): Contraction Frequency (Minutes): x3   Ctx Count in 10 min:     Duration:     Intensity: Contraction Intensity: no contractions   Intensity by IUPC:     Resting Tone: Uterine Resting Tone: soft by palpation(no ctx)   Resting Tone by IUPC:     Sunflower Units:       Cervix: Exam  by:     Dilation:     Effacement:     Station:         Most recent ultrasound:    Medications:    famotidine 20 mg Oral BID AC      •  acetaminophen  •  ondansetron    Labs:  Lab Results (last 24 hours)     ** No results found for the last 24 hours. **        Lab Results   Component Value Date    HGB 11.9 (L) 02/15/2020         Assessment/Plan:      Anette is a 30 y.o.    at 33w2d.  1. Preclampsia with borderline BP and 3g proteinuria  2. Fetal: NSTs bid; PDC scan in the morning with MFM consult.   .  All questions were answered to the best my ability.    Kwan Padilla MD  2020

## 2020-02-17 ENCOUNTER — APPOINTMENT (OUTPATIENT)
Dept: WOMENS IMAGING | Facility: HOSPITAL | Age: 30
End: 2020-02-17

## 2020-02-17 LAB
ALP SERPL-CCNC: 137 U/L (ref 39–117)
ALT SERPL W P-5'-P-CCNC: 28 U/L (ref 1–33)
AST SERPL-CCNC: 31 U/L (ref 1–32)
BACTERIA SPEC AEROBE CULT: NORMAL
BASOPHILS # BLD AUTO: 0.06 10*3/MM3 (ref 0–0.2)
BASOPHILS NFR BLD AUTO: 0.4 % (ref 0–1.5)
BILIRUB SERPL-MCNC: <0.2 MG/DL (ref 0.2–1.2)
CREAT BLD-MCNC: 0.73 MG/DL (ref 0.57–1)
DEPRECATED RDW RBC AUTO: 41.9 FL (ref 37–54)
EOSINOPHIL # BLD AUTO: 0.01 10*3/MM3 (ref 0–0.4)
EOSINOPHIL NFR BLD AUTO: 0.1 % (ref 0.3–6.2)
ERYTHROCYTE [DISTWIDTH] IN BLOOD BY AUTOMATED COUNT: 12.2 % (ref 12.3–15.4)
HCT VFR BLD AUTO: 38.7 % (ref 34–46.6)
HGB BLD-MCNC: 13.1 G/DL (ref 12–15.9)
IMM GRANULOCYTES # BLD AUTO: 0.72 10*3/MM3 (ref 0–0.05)
IMM GRANULOCYTES NFR BLD AUTO: 4.2 % (ref 0–0.5)
LDH SERPL-CCNC: 259 U/L (ref 135–214)
LYMPHOCYTES # BLD AUTO: 1.82 10*3/MM3 (ref 0.7–3.1)
LYMPHOCYTES NFR BLD AUTO: 10.7 % (ref 19.6–45.3)
MCH RBC QN AUTO: 32.1 PG (ref 26.6–33)
MCHC RBC AUTO-ENTMCNC: 33.9 G/DL (ref 31.5–35.7)
MCV RBC AUTO: 94.9 FL (ref 79–97)
MONOCYTES # BLD AUTO: 1.27 10*3/MM3 (ref 0.1–0.9)
MONOCYTES NFR BLD AUTO: 7.5 % (ref 5–12)
NEUTROPHILS # BLD AUTO: 13.08 10*3/MM3 (ref 1.7–7)
NEUTROPHILS NFR BLD AUTO: 77.1 % (ref 42.7–76)
NRBC BLD AUTO-RTO: 0.3 /100 WBC (ref 0–0.2)
PLATELET # BLD AUTO: 270 10*3/MM3 (ref 140–450)
PMV BLD AUTO: 10.9 FL (ref 6–12)
RBC # BLD AUTO: 4.08 10*6/MM3 (ref 3.77–5.28)
URATE SERPL-MCNC: 6.1 MG/DL (ref 2.4–5.7)
WBC NRBC COR # BLD: 16.96 10*3/MM3 (ref 3.4–10.8)

## 2020-02-17 PROCEDURE — 76816 OB US FOLLOW-UP PER FETUS: CPT | Performed by: OBSTETRICS & GYNECOLOGY

## 2020-02-17 PROCEDURE — 76819 FETAL BIOPHYS PROFIL W/O NST: CPT

## 2020-02-17 PROCEDURE — 83615 LACTATE (LD) (LDH) ENZYME: CPT | Performed by: OBSTETRICS & GYNECOLOGY

## 2020-02-17 PROCEDURE — 82565 ASSAY OF CREATININE: CPT | Performed by: OBSTETRICS & GYNECOLOGY

## 2020-02-17 PROCEDURE — 59025 FETAL NON-STRESS TEST: CPT

## 2020-02-17 PROCEDURE — 84550 ASSAY OF BLOOD/URIC ACID: CPT | Performed by: OBSTETRICS & GYNECOLOGY

## 2020-02-17 PROCEDURE — 76819 FETAL BIOPHYS PROFIL W/O NST: CPT | Performed by: OBSTETRICS & GYNECOLOGY

## 2020-02-17 PROCEDURE — 82247 BILIRUBIN TOTAL: CPT | Performed by: OBSTETRICS & GYNECOLOGY

## 2020-02-17 PROCEDURE — 76816 OB US FOLLOW-UP PER FETUS: CPT

## 2020-02-17 PROCEDURE — 25010000002 MAGNESIUM SULFATE PER 500 MG OF MAGNESIUM: Performed by: OBSTETRICS & GYNECOLOGY

## 2020-02-17 PROCEDURE — 84460 ALANINE AMINO (ALT) (SGPT): CPT | Performed by: OBSTETRICS & GYNECOLOGY

## 2020-02-17 PROCEDURE — 84450 TRANSFERASE (AST) (SGOT): CPT | Performed by: OBSTETRICS & GYNECOLOGY

## 2020-02-17 PROCEDURE — 84075 ASSAY ALKALINE PHOSPHATASE: CPT | Performed by: OBSTETRICS & GYNECOLOGY

## 2020-02-17 PROCEDURE — 85025 COMPLETE CBC W/AUTO DIFF WBC: CPT | Performed by: OBSTETRICS & GYNECOLOGY

## 2020-02-17 RX ORDER — LABETALOL HYDROCHLORIDE 5 MG/ML
20-80 INJECTION, SOLUTION INTRAVENOUS
Status: DISPENSED | OUTPATIENT
Start: 2020-02-17 | End: 2020-02-18

## 2020-02-17 RX ORDER — SODIUM CHLORIDE 0.9 % (FLUSH) 0.9 %
10 SYRINGE (ML) INJECTION EVERY 12 HOURS SCHEDULED
Status: DISCONTINUED | OUTPATIENT
Start: 2020-02-17 | End: 2020-02-21 | Stop reason: HOSPADM

## 2020-02-17 RX ORDER — SODIUM CHLORIDE 0.9 % (FLUSH) 0.9 %
10 SYRINGE (ML) INJECTION AS NEEDED
Status: DISCONTINUED | OUTPATIENT
Start: 2020-02-17 | End: 2020-02-21 | Stop reason: HOSPADM

## 2020-02-17 RX ORDER — DEXTROSE AND SODIUM CHLORIDE 5; .2 G/100ML; G/100ML
75 INJECTION, SOLUTION INTRAVENOUS CONTINUOUS
Status: DISCONTINUED | OUTPATIENT
Start: 2020-02-17 | End: 2020-02-21 | Stop reason: HOSPADM

## 2020-02-17 RX ORDER — MAGNESIUM SULF/RINGERS LACTATE 20 G/290ML
2 PLASTIC BAG, INJECTION (ML) INTRAVENOUS CONTINUOUS
Status: DISPENSED | OUTPATIENT
Start: 2020-02-17 | End: 2020-02-20

## 2020-02-17 RX ADMIN — FAMOTIDINE 20 MG: 20 TABLET ORAL at 18:05

## 2020-02-17 RX ADMIN — LABETALOL 20 MG/4 ML (5 MG/ML) INTRAVENOUS SYRINGE 20 MG: at 22:43

## 2020-02-17 RX ADMIN — ACETAMINOPHEN 650 MG: 325 TABLET ORAL at 23:39

## 2020-02-17 RX ADMIN — ACETAMINOPHEN 650 MG: 325 TABLET ORAL at 05:24

## 2020-02-17 RX ADMIN — Medication 2 G/HR: at 17:15

## 2020-02-17 RX ADMIN — DEXTROSE AND SODIUM CHLORIDE 75 ML/HR: 5; 200 INJECTION, SOLUTION INTRAVENOUS at 17:07

## 2020-02-17 RX ADMIN — FAMOTIDINE 20 MG: 20 TABLET ORAL at 08:25

## 2020-02-17 NOTE — POST-PROCEDURE NOTE
Documentation of the ultasound findings, images, and interpretations with be available in the patient's Viewpoint report located in the Chart Review Imaging tab in EPIC.    Brief Ultrasound Note Mendez    Single viable intrauterine pregnancy in the cephalic presentation; although actually compound presentation as a feet or over the head  The amniotic fluid volume is normal  Amniotic Fluid Index (GENNA) equals 16.0 cm.  Biophysical Profile (BPP) equals 8/8  Estimated Fetal Weight by ultrasound is 1862 grams  There is a 2-week lag in the fetal abdominal circumference measurement.  Mild bilateral renal pelvis dilatation (AP diameter right 4.3 mm, left 5.7 mm).  Umbilical artery SD ratio = 2.23 to 1 (normal)  The placenta is posterior without previa.  The placental edge is 4 cm to the internal cervical loss.    Would not consider the ultrasound findings an indication for delivery at this time.  Would continue maternal evaluation particularly with liver function test today as the patient is complaining of right upper quadrant pain.  If liver enzymes are 2 times upper limits of normal with concurrent right upper quadrant pain, would proceed with delivery once steroids are therapeutic

## 2020-02-17 NOTE — PROGRESS NOTES
Patient stable at this time.  Will be getting another 24-hour urine and we will observe her.  Will repeat labs tomorrow.

## 2020-02-17 NOTE — PLAN OF CARE
Problem: Patient Care Overview  Goal: Plan of Care Review  Outcome: Ongoing (interventions implemented as appropriate)  Flowsheets (Taken 2/17/2020 7458)  Progress: improving  Plan of Care Reviewed With: patient; family  Outcome Summary: BID NST, PDC US this am, repeating 24hr urine & labs, PDC & [place] aware of epigastric pain c/o, daily weight

## 2020-02-18 ENCOUNTER — ANESTHESIA (OUTPATIENT)
Dept: LABOR AND DELIVERY | Facility: HOSPITAL | Age: 30
End: 2020-02-18

## 2020-02-18 ENCOUNTER — APPOINTMENT (OUTPATIENT)
Dept: WOMENS IMAGING | Facility: HOSPITAL | Age: 30
End: 2020-02-18

## 2020-02-18 ENCOUNTER — APPOINTMENT (OUTPATIENT)
Dept: GENERAL RADIOLOGY | Facility: HOSPITAL | Age: 30
End: 2020-02-18

## 2020-02-18 ENCOUNTER — ANESTHESIA EVENT (OUTPATIENT)
Dept: LABOR AND DELIVERY | Facility: HOSPITAL | Age: 30
End: 2020-02-18

## 2020-02-18 LAB
ALP SERPL-CCNC: 116 U/L (ref 39–117)
ALP SERPL-CCNC: 120 U/L (ref 39–117)
ALT SERPL W P-5'-P-CCNC: 109 U/L (ref 1–33)
ALT SERPL W P-5'-P-CCNC: 50 U/L (ref 1–33)
APTT PPP: 24.2 SECONDS (ref 24–37)
AST SERPL-CCNC: 55 U/L (ref 1–32)
AST SERPL-CCNC: 98 U/L (ref 1–32)
ATMOSPHERIC PRESS: ABNORMAL MM[HG]
ATMOSPHERIC PRESS: ABNORMAL MM[HG]
BASE EXCESS BLDCOA CALC-SCNC: -10.5 MMOL/L (ref 0–2)
BASE EXCESS BLDCOV CALC-SCNC: -10.1 MMOL/L (ref 0–2)
BDY SITE: ABNORMAL
BDY SITE: ABNORMAL
BILIRUB SERPL-MCNC: 0.2 MG/DL (ref 0.2–1.2)
BILIRUB SERPL-MCNC: 0.3 MG/DL (ref 0.2–1.2)
BODY TEMPERATURE: 37 C
BODY TEMPERATURE: 37 C
CO2 BLDA-SCNC: 23.6 MMOL/L (ref 22–33)
CO2 BLDA-SCNC: 24.4 MMOL/L (ref 22–33)
COLLECT TME SMN: ABNORMAL
CREAT BLD-MCNC: 0.73 MG/DL (ref 0.57–1)
CREAT BLD-MCNC: 0.82 MG/DL (ref 0.57–1)
DEPRECATED RDW RBC AUTO: 41.8 FL (ref 37–54)
DEPRECATED RDW RBC AUTO: 42.5 FL (ref 37–54)
ERYTHROCYTE [DISTWIDTH] IN BLOOD BY AUTOMATED COUNT: 12.2 % (ref 12.3–15.4)
ERYTHROCYTE [DISTWIDTH] IN BLOOD BY AUTOMATED COUNT: 12.3 % (ref 12.3–15.4)
FIBRINOGEN PPP-MCNC: 313 MG/DL (ref 220–400)
HCO3 BLDCOA-SCNC: 22 MMOL/L (ref 16.9–20.5)
HCO3 BLDCOV-SCNC: 21.5 MMOL/L (ref 18.6–21.4)
HCT VFR BLD AUTO: 27.7 % (ref 34–46.6)
HCT VFR BLD AUTO: 29.5 % (ref 34–46.6)
HGB BLD-MCNC: 9.3 G/DL (ref 12–15.9)
HGB BLD-MCNC: 9.8 G/DL (ref 12–15.9)
HGB BLDA-MCNC: 17.8 G/DL (ref 14–18)
HGB BLDA-MCNC: 17.8 G/DL (ref 14–18)
HOROWITZ INDEX BLD+IHG-RTO: 21 %
HOROWITZ INDEX BLD+IHG-RTO: 21 %
INR PPP: 1.02 (ref 0.85–1.16)
LDH SERPL-CCNC: 352 U/L (ref 135–214)
LDH SERPL-CCNC: 432 U/L (ref 135–214)
Lab: ABNORMAL
Lab: ABNORMAL
MCH RBC QN AUTO: 31.2 PG (ref 26.6–33)
MCH RBC QN AUTO: 31.8 PG (ref 26.6–33)
MCHC RBC AUTO-ENTMCNC: 33.2 G/DL (ref 31.5–35.7)
MCHC RBC AUTO-ENTMCNC: 33.6 G/DL (ref 31.5–35.7)
MCV RBC AUTO: 93.9 FL (ref 79–97)
MCV RBC AUTO: 94.9 FL (ref 79–97)
MODALITY: ABNORMAL
MODALITY: ABNORMAL
NOTE: ABNORMAL
NOTE: ABNORMAL
NOTIFIED BY: ABNORMAL
NOTIFIED BY: ABNORMAL
NOTIFIED WHO: ABNORMAL
NOTIFIED WHO: ABNORMAL
PCO2 BLDCOA: 77.4 MMHG (ref 43.3–54.9)
PCO2 BLDCOV: 69.8 MM HG
PH BLDCOA: 7.06 PH UNITS (ref 7.22–7.3)
PH BLDCOV: 7.1 PH UNITS
PLATELET # BLD AUTO: 149 10*3/MM3 (ref 140–450)
PLATELET # BLD AUTO: 197 10*3/MM3 (ref 140–450)
PMV BLD AUTO: 10.7 FL (ref 6–12)
PMV BLD AUTO: 10.9 FL (ref 6–12)
PO2 BLDCOA: 7.5 MMHG (ref 11.5–43.3)
PO2 BLDCOV: 12.1 MM HG
PROTHROMBIN TIME: 12.9 SECONDS (ref 11.2–14.3)
RBC # BLD AUTO: 2.92 10*6/MM3 (ref 3.77–5.28)
RBC # BLD AUTO: 3.14 10*6/MM3 (ref 3.77–5.28)
SAO2 % BLDCOA: 5.3 %
SAO2 % BLDCOA: ABNORMAL %
SAO2 % BLDCOV: 12.4 %
URATE SERPL-MCNC: 5.2 MG/DL (ref 2.4–5.7)
WBC NRBC COR # BLD: 18.83 10*3/MM3 (ref 3.4–10.8)
WBC NRBC COR # BLD: 23.23 10*3/MM3 (ref 3.4–10.8)

## 2020-02-18 PROCEDURE — 84450 TRANSFERASE (AST) (SGOT): CPT | Performed by: OBSTETRICS & GYNECOLOGY

## 2020-02-18 PROCEDURE — 82247 BILIRUBIN TOTAL: CPT | Performed by: OBSTETRICS & GYNECOLOGY

## 2020-02-18 PROCEDURE — 85384 FIBRINOGEN ACTIVITY: CPT | Performed by: OBSTETRICS & GYNECOLOGY

## 2020-02-18 PROCEDURE — 25010000002 MAGNESIUM SULFATE PER 500 MG OF MAGNESIUM: Performed by: OBSTETRICS & GYNECOLOGY

## 2020-02-18 PROCEDURE — 88307 TISSUE EXAM BY PATHOLOGIST: CPT | Performed by: OBSTETRICS & GYNECOLOGY

## 2020-02-18 PROCEDURE — 82805 BLOOD GASES W/O2 SATURATION: CPT

## 2020-02-18 PROCEDURE — 25010000003 MORPHINE PER 10 MG: Performed by: ANESTHESIOLOGY

## 2020-02-18 PROCEDURE — 84550 ASSAY OF BLOOD/URIC ACID: CPT | Performed by: OBSTETRICS & GYNECOLOGY

## 2020-02-18 PROCEDURE — 84075 ASSAY ALKALINE PHOSPHATASE: CPT | Performed by: OBSTETRICS & GYNECOLOGY

## 2020-02-18 PROCEDURE — 85730 THROMBOPLASTIN TIME PARTIAL: CPT | Performed by: OBSTETRICS & GYNECOLOGY

## 2020-02-18 PROCEDURE — 25010000002 FENTANYL CITRATE (PF) 100 MCG/2ML SOLUTION: Performed by: ANESTHESIOLOGY

## 2020-02-18 PROCEDURE — 82565 ASSAY OF CREATININE: CPT | Performed by: OBSTETRICS & GYNECOLOGY

## 2020-02-18 PROCEDURE — 83615 LACTATE (LD) (LDH) ENZYME: CPT | Performed by: OBSTETRICS & GYNECOLOGY

## 2020-02-18 PROCEDURE — 25010000002 MIDAZOLAM PER 1 MG: Performed by: ANESTHESIOLOGY

## 2020-02-18 PROCEDURE — 25010000002 PROMETHAZINE PER 50 MG: Performed by: OBSTETRICS & GYNECOLOGY

## 2020-02-18 PROCEDURE — 25010000002 ONDANSETRON PER 1 MG: Performed by: ANESTHESIOLOGY

## 2020-02-18 PROCEDURE — 74018 RADEX ABDOMEN 1 VIEW: CPT

## 2020-02-18 PROCEDURE — 85610 PROTHROMBIN TIME: CPT | Performed by: OBSTETRICS & GYNECOLOGY

## 2020-02-18 PROCEDURE — 85027 COMPLETE CBC AUTOMATED: CPT | Performed by: OBSTETRICS & GYNECOLOGY

## 2020-02-18 PROCEDURE — 84460 ALANINE AMINO (ALT) (SGPT): CPT | Performed by: OBSTETRICS & GYNECOLOGY

## 2020-02-18 PROCEDURE — 25010000002 TERBUTALINE PER 1 MG: Performed by: OBSTETRICS & GYNECOLOGY

## 2020-02-18 PROCEDURE — 59514 CESAREAN DELIVERY ONLY: CPT | Performed by: OBSTETRICS & GYNECOLOGY

## 2020-02-18 PROCEDURE — 25010000002 MAGNESIUM SULFATE-LACT RINGERS 20 GM/500ML SOLUTION: Performed by: OBSTETRICS & GYNECOLOGY

## 2020-02-18 PROCEDURE — 25010000003 CEFAZOLIN IN DEXTROSE 2-4 GM/100ML-% SOLUTION: Performed by: OBSTETRICS & GYNECOLOGY

## 2020-02-18 RX ORDER — OXYTOCIN 10 [USP'U]/ML
INJECTION, SOLUTION INTRAMUSCULAR; INTRAVENOUS AS NEEDED
Status: DISCONTINUED | OUTPATIENT
Start: 2020-02-18 | End: 2020-02-18 | Stop reason: SURG

## 2020-02-18 RX ORDER — DIPHENHYDRAMINE HCL 25 MG
25 CAPSULE ORAL EVERY 4 HOURS PRN
Status: DISCONTINUED | OUTPATIENT
Start: 2020-02-18 | End: 2020-02-21 | Stop reason: HOSPADM

## 2020-02-18 RX ORDER — ONDANSETRON 4 MG/1
4 TABLET, FILM COATED ORAL EVERY 8 HOURS PRN
Status: DISCONTINUED | OUTPATIENT
Start: 2020-02-18 | End: 2020-02-21 | Stop reason: HOSPADM

## 2020-02-18 RX ORDER — IBUPROFEN 600 MG/1
600 TABLET ORAL EVERY 6 HOURS SCHEDULED
Status: DISCONTINUED | OUTPATIENT
Start: 2020-02-18 | End: 2020-02-21 | Stop reason: HOSPADM

## 2020-02-18 RX ORDER — NALOXONE HCL 0.4 MG/ML
0.4 VIAL (ML) INJECTION
Status: ACTIVE | OUTPATIENT
Start: 2020-02-18 | End: 2020-02-19

## 2020-02-18 RX ORDER — TRISODIUM CITRATE DIHYDRATE AND CITRIC ACID MONOHYDRATE 500; 334 MG/5ML; MG/5ML
30 SOLUTION ORAL ONCE
Status: COMPLETED | OUTPATIENT
Start: 2020-02-18 | End: 2020-02-18

## 2020-02-18 RX ORDER — SODIUM CHLORIDE 0.9 % (FLUSH) 0.9 %
3 SYRINGE (ML) INJECTION EVERY 12 HOURS SCHEDULED
Status: DISCONTINUED | OUTPATIENT
Start: 2020-02-18 | End: 2020-02-21 | Stop reason: HOSPADM

## 2020-02-18 RX ORDER — DIPHENHYDRAMINE HYDROCHLORIDE 50 MG/ML
25 INJECTION INTRAMUSCULAR; INTRAVENOUS EVERY 4 HOURS PRN
Status: DISCONTINUED | OUTPATIENT
Start: 2020-02-18 | End: 2020-02-21 | Stop reason: HOSPADM

## 2020-02-18 RX ORDER — HYDROMORPHONE HYDROCHLORIDE 1 MG/ML
0.5 INJECTION, SOLUTION INTRAMUSCULAR; INTRAVENOUS; SUBCUTANEOUS
Status: ACTIVE | OUTPATIENT
Start: 2020-02-18 | End: 2020-02-19

## 2020-02-18 RX ORDER — PROMETHAZINE HYDROCHLORIDE 25 MG/ML
12.5 INJECTION, SOLUTION INTRAMUSCULAR; INTRAVENOUS EVERY 6 HOURS PRN
Status: DISCONTINUED | OUTPATIENT
Start: 2020-02-18 | End: 2020-02-21 | Stop reason: HOSPADM

## 2020-02-18 RX ORDER — CEFAZOLIN SODIUM 2 G/100ML
2 INJECTION, SOLUTION INTRAVENOUS ONCE
Status: COMPLETED | OUTPATIENT
Start: 2020-02-18 | End: 2020-02-18

## 2020-02-18 RX ORDER — LANOLIN
CREAM (ML) TOPICAL
Status: DISCONTINUED | OUTPATIENT
Start: 2020-02-18 | End: 2020-02-21 | Stop reason: HOSPADM

## 2020-02-18 RX ORDER — MIDAZOLAM HYDROCHLORIDE 1 MG/ML
INJECTION INTRAMUSCULAR; INTRAVENOUS AS NEEDED
Status: DISCONTINUED | OUTPATIENT
Start: 2020-02-18 | End: 2020-02-18 | Stop reason: SURG

## 2020-02-18 RX ORDER — SIMETHICONE 80 MG
80 TABLET,CHEWABLE ORAL 4 TIMES DAILY PRN
Status: DISCONTINUED | OUTPATIENT
Start: 2020-02-18 | End: 2020-02-18 | Stop reason: SDUPTHER

## 2020-02-18 RX ORDER — MORPHINE SULFATE 0.5 MG/ML
INJECTION, SOLUTION EPIDURAL; INTRATHECAL; INTRAVENOUS AS NEEDED
Status: DISCONTINUED | OUTPATIENT
Start: 2020-02-18 | End: 2020-02-18 | Stop reason: SURG

## 2020-02-18 RX ORDER — SODIUM CHLORIDE, SODIUM LACTATE, POTASSIUM CHLORIDE, CALCIUM CHLORIDE 600; 310; 30; 20 MG/100ML; MG/100ML; MG/100ML; MG/100ML
1000 INJECTION, SOLUTION INTRAVENOUS CONTINUOUS
Status: ACTIVE | OUTPATIENT
Start: 2020-02-18 | End: 2020-02-18

## 2020-02-18 RX ORDER — ONDANSETRON 2 MG/ML
4 INJECTION INTRAMUSCULAR; INTRAVENOUS ONCE
Status: COMPLETED | OUTPATIENT
Start: 2020-02-18 | End: 2020-02-18

## 2020-02-18 RX ORDER — DOCUSATE SODIUM 100 MG/1
100 CAPSULE, LIQUID FILLED ORAL 2 TIMES DAILY
Status: DISCONTINUED | OUTPATIENT
Start: 2020-02-18 | End: 2020-02-21 | Stop reason: HOSPADM

## 2020-02-18 RX ORDER — HYDROCODONE BITARTRATE AND ACETAMINOPHEN 5; 325 MG/1; MG/1
1 TABLET ORAL EVERY 4 HOURS PRN
Status: DISCONTINUED | OUTPATIENT
Start: 2020-02-18 | End: 2020-02-21 | Stop reason: HOSPADM

## 2020-02-18 RX ORDER — HYDROCODONE BITARTRATE AND ACETAMINOPHEN 10; 325 MG/1; MG/1
1 TABLET ORAL EVERY 4 HOURS PRN
Status: DISCONTINUED | OUTPATIENT
Start: 2020-02-18 | End: 2020-02-21 | Stop reason: HOSPADM

## 2020-02-18 RX ORDER — TERBUTALINE SULFATE 1 MG/ML
0.25 INJECTION, SOLUTION SUBCUTANEOUS ONCE
Status: COMPLETED | OUTPATIENT
Start: 2020-02-18 | End: 2020-02-18

## 2020-02-18 RX ORDER — ZOLPIDEM TARTRATE 5 MG/1
5 TABLET ORAL NIGHTLY PRN
Status: DISCONTINUED | OUTPATIENT
Start: 2020-02-18 | End: 2020-02-21 | Stop reason: HOSPADM

## 2020-02-18 RX ORDER — BUPIVACAINE HYDROCHLORIDE 7.5 MG/ML
INJECTION, SOLUTION EPIDURAL; RETROBULBAR AS NEEDED
Status: DISCONTINUED | OUTPATIENT
Start: 2020-02-18 | End: 2020-02-18 | Stop reason: SURG

## 2020-02-18 RX ORDER — FENTANYL CITRATE 50 UG/ML
INJECTION, SOLUTION INTRAMUSCULAR; INTRAVENOUS AS NEEDED
Status: DISCONTINUED | OUTPATIENT
Start: 2020-02-18 | End: 2020-02-18 | Stop reason: SURG

## 2020-02-18 RX ORDER — SODIUM CHLORIDE 0.9 % (FLUSH) 0.9 %
1-10 SYRINGE (ML) INJECTION AS NEEDED
Status: DISCONTINUED | OUTPATIENT
Start: 2020-02-18 | End: 2020-02-21 | Stop reason: HOSPADM

## 2020-02-18 RX ORDER — DEXTROSE AND SODIUM CHLORIDE 5; .2 G/100ML; G/100ML
500 INJECTION, SOLUTION INTRAVENOUS CONTINUOUS
Status: ACTIVE | OUTPATIENT
Start: 2020-02-18 | End: 2020-02-18

## 2020-02-18 RX ORDER — SIMETHICONE 80 MG
80 TABLET,CHEWABLE ORAL 4 TIMES DAILY PRN
Status: DISCONTINUED | OUTPATIENT
Start: 2020-02-18 | End: 2020-02-21 | Stop reason: HOSPADM

## 2020-02-18 RX ADMIN — ONDANSETRON 4 MG: 2 INJECTION INTRAMUSCULAR; INTRAVENOUS at 09:00

## 2020-02-18 RX ADMIN — Medication 1 G/HR: at 19:37

## 2020-02-18 RX ADMIN — BUPIVACAINE HYDROCHLORIDE 1.4 ML: 7.5 INJECTION, SOLUTION EPIDURAL; RETROBULBAR at 04:21

## 2020-02-18 RX ADMIN — SODIUM CITRATE AND CITRIC ACID MONOHYDRATE 30 ML: 500; 334 SOLUTION ORAL at 01:13

## 2020-02-18 RX ADMIN — DOCUSATE SODIUM 100 MG: 100 CAPSULE, LIQUID FILLED ORAL at 20:45

## 2020-02-18 RX ADMIN — SODIUM CITRATE AND CITRIC ACID MONOHYDRATE 30 ML: 500; 334 SOLUTION ORAL at 04:12

## 2020-02-18 RX ADMIN — FAMOTIDINE 20 MG: 20 TABLET ORAL at 20:44

## 2020-02-18 RX ADMIN — OXYTOCIN 30 UNITS: 10 INJECTION, SOLUTION INTRAMUSCULAR; INTRAVENOUS at 04:55

## 2020-02-18 RX ADMIN — SODIUM CHLORIDE, POTASSIUM CHLORIDE, SODIUM LACTATE AND CALCIUM CHLORIDE 1000 ML/HR: 600; 310; 30; 20 INJECTION, SOLUTION INTRAVENOUS at 04:00

## 2020-02-18 RX ADMIN — PROMETHAZINE HYDROCHLORIDE 12.5 MG: 25 INJECTION INTRAMUSCULAR; INTRAVENOUS at 11:52

## 2020-02-18 RX ADMIN — TERBUTALINE SULFATE 0.25 MG: 1 INJECTION SUBCUTANEOUS at 04:12

## 2020-02-18 RX ADMIN — DEXTROSE AND SODIUM CHLORIDE 75 ML/HR: 5; 200 INJECTION, SOLUTION INTRAVENOUS at 19:40

## 2020-02-18 RX ADMIN — IBUPROFEN 600 MG: 600 TABLET, FILM COATED ORAL at 20:45

## 2020-02-18 RX ADMIN — SODIUM CHLORIDE, POTASSIUM CHLORIDE, SODIUM LACTATE AND CALCIUM CHLORIDE: 600; 310; 30; 20 INJECTION, SOLUTION INTRAVENOUS at 04:17

## 2020-02-18 RX ADMIN — HYDROCODONE BITARTRATE AND ACETAMINOPHEN 1 TABLET: 10; 325 TABLET ORAL at 20:44

## 2020-02-18 RX ADMIN — FENTANYL CITRATE 15 MCG: 50 INJECTION, SOLUTION INTRAMUSCULAR; INTRAVENOUS at 04:21

## 2020-02-18 RX ADMIN — OXYTOCIN 30 UNITS: 10 INJECTION, SOLUTION INTRAMUSCULAR; INTRAVENOUS at 04:36

## 2020-02-18 RX ADMIN — MORPHINE SULFATE 0.15 MG: 0.5 INJECTION, SOLUTION EPIDURAL; INTRATHECAL; INTRAVENOUS at 04:21

## 2020-02-18 RX ADMIN — CEFAZOLIN SODIUM 2 G: 2 INJECTION, SOLUTION INTRAVENOUS at 04:16

## 2020-02-18 RX ADMIN — SIMETHICONE CHEW TAB 80 MG 80 MG: 80 TABLET ORAL at 09:01

## 2020-02-18 RX ADMIN — LABETALOL 20 MG/4 ML (5 MG/ML) INTRAVENOUS SYRINGE 20 MG: at 02:19

## 2020-02-18 RX ADMIN — OXYTOCIN 3 UNITS: 10 INJECTION, SOLUTION INTRAMUSCULAR; INTRAVENOUS at 04:29

## 2020-02-18 RX ADMIN — FAMOTIDINE 20 MG: 20 TABLET ORAL at 09:01

## 2020-02-18 RX ADMIN — MIDAZOLAM 0.5 MG: 1 INJECTION INTRAMUSCULAR; INTRAVENOUS at 04:36

## 2020-02-18 RX ADMIN — ZOLPIDEM TARTRATE 5 MG: 5 TABLET ORAL at 01:22

## 2020-02-18 RX ADMIN — IBUPROFEN 600 MG: 600 TABLET, FILM COATED ORAL at 09:01

## 2020-02-18 RX ADMIN — Medication 2 G/HR: at 05:20

## 2020-02-18 RX ADMIN — OXYTOCIN 3 UNITS: 10 INJECTION, SOLUTION INTRAMUSCULAR; INTRAVENOUS at 04:26

## 2020-02-18 RX ADMIN — Medication 2 G/HR: at 03:11

## 2020-02-18 RX ADMIN — SIMETHICONE CHEW TAB 80 MG 80 MG: 80 TABLET ORAL at 00:45

## 2020-02-18 RX ADMIN — DOCUSATE SODIUM 100 MG: 100 CAPSULE, LIQUID FILLED ORAL at 09:01

## 2020-02-18 NOTE — OP NOTE
Operative Note    Patient name: Anette Linton  YOB: 1990   MRN: [id number]  Admission Date: 2020  Referring Provider: Dariel Aragon MD    ID: 30 y.o.  at 33w4d    Preoperative Diagnosis:   Patient Active Problem List   Diagnosis   • Migraine   • Annual GYN exam w/o problems   • Supervision of normal first pregnancy   • Abnormal MSAFP (maternal serum alpha-fetoprotein), elevated   • Pregnancy   • Marginal placenta previa   • Preeclampsia     Indication for Delivery: Placental abruption and hemorrhage.    Postoperative Diagnosis: Same as above.    Procedure(s): primarylow transverse  delivery     Surgeons: Surgeon(s) and Role:     * Elvira Sampson MD - Primary     * Abdirashid Sousa MD - Assisting    Anesthesia: Spinal    Estimated Blood Loss: 1000 mL    IV Fluids: 1300 mls    Preoperative antibiotic: Ancef (cefazolin) 2 grams    Blood products:   Blood Administration Record (From admission, onward)    None          Pathology:   Order Name Source Comment Collection Info Order Time   TISSUE PATHOLOGY EXAM Placenta  Collected By: Nereida Joshua RN 2020  4:32 AM     Specimen source(s):   Placenta            Drains: Santiago catheter to gravity    Complications: None    Condition: Stable to recovery room                                          Infant:                 Gender: male  infant    Weight: 1850 g (4 lb 1.3 oz)     Apgars: 6   @ 1 minute /     8   @ 5 minutes      Cord gases: Pending              Operative Summary:   After obtaining informed consent the patient was taken to the operating room where adequate anesthesia was obtained.  Santiago catheter was placed in the bladder preoperatively.  IV antibiotics were given preoperatively.       The abdomen was prepped and draped in the usual sterile fashion for  delivery.  After confirming adequate anesthesia a Pfannenstiel skin incision was made with the scalpel and carried through to the underlying layer of  fascia.  The fascia was incised in the midline and the incision extended laterally with the Medina scissors and with blunt dissection.       The upper aspect of the fascia was grasped with 2 Kocher clamps, elevated, and dissected off the underlying rectus muscles bluntly and with the Medina scissors.  The Kocher clamps were removed and applied to the inferior aspect of the fascia.  The fascia was dissected off of the rectus muscles in the same fashion.  The peritoneum was entered bluntly.  The incision was stretched and the bladder blade and Ochoa retractor inserted for visualization of the uterus.       The uterus was incised with the scalpel in a low transverse fashion.  The uterine incision was entered digitally and the incision extended bluntly in a cranial-caudal fashion.  Retractors were removed and membranes were ruptured.  The infant was delivered atraumatically from vertex  presentation.  The umbilical cord was clamped and cut and the nose and mouth bulb suctioned.  The infant was handed off to waiting pediatric staff.       Cord blood gases were collected.  Cord blood was collected.  The placenta was removed using cord traction and uterine massage.  The uterus was exteriorized and cleared of all clots and debris.  The uterine incision was repaired with #1 Monocryl in a running locked fashion. A double-layer technique was used.  Additional hemostatic measures required: figure-of-eight sutures.    The incision was inspected and excellent hemostasis was noted.  The tubes and ovaries were noted to be normal.  The uterus was returned to the abdomen.  The gutters were cleared of all clots and debris.  Irrigation was used.  The uterine incision was again inspected and found to be hemostatic.       The peritoneum was reapproximated with 2-0 Vicryl.  The fascia was closed with #1 Vicryl in a running fashion.  The subcutaneous space was reapproximated using 3-0 Chromic gut.      The skin was closed using 3-0  Macrina.  The patient was transferred to the recovery room in stable condition.    Elvira Sampson MD  2/18/2020

## 2020-02-18 NOTE — PROGRESS NOTES
Laborist    CTSP re syncopal episode in the NICU    Chart reviewed patient seen    Patient underwent a primary  section for placental abruption/hemorrhage at 5 AM this morning  Patient is also on magnesium sulfate secondary to preeclampsia.    Patient sitting in wheelchair alert in no acute distress  Color is pale  Heart rate 74 blood pressure 105/68 heart rate 74 pulse ox 97% on room air    PLAN  1.  Patient placed back in the bed given a 500 cc fluid bolus and labs will be drawn.  2.  Discussed with Dr. Aragon.

## 2020-02-18 NOTE — INTERVAL H&P NOTE
H&P updated. The patient was examined and Heart tones are down in the 90's with new onset vag bleeding.  Uterus firm.  A1)abruptio    2)stat     3)Dr Fortune notified and en-route

## 2020-02-18 NOTE — ANESTHESIA PROCEDURE NOTES
Spinal Block      Patient reassessed immediately prior to procedure    Patient location during procedure: OB  Start Time: 2/18/2020 4:21 AM  Indication:procedure for pain  Performed By  Anesthesiologist: Prema Lamb DO  Preanesthetic Checklist  Completed: patient identified, surgical consent, pre-op evaluation, timeout performed, IV checked, risks and benefits discussed and monitors and equipment checked  Spinal Block Prep:  Patient Position:sitting  Sterile Tech:cap, gloves, mask and sterile barriers  Prep:Betadine  Patient Monitoring:blood pressure monitoring and EKG  Spinal Block Procedure  Approach:midline  Guidance:palpation technique  Location:L3-L4  Needle Type:Lana  Needle Gauge:25 G  Placement of Spinal needle event:cerebrospinal fluid aspirated  Paresthesia: no  Fluid Appearance:clear     Post Assessment  Patient Tolerance:patient tolerated the procedure well with no apparent complications  Complications no

## 2020-02-18 NOTE — ANESTHESIA PREPROCEDURE EVALUATION
Anesthesia Evaluation     Patient summary reviewed and Nursing notes reviewed   NPO Solid Status: Waived due to emergency  NPO Liquid Status: Waived due to emergency           Airway   Mallampati: II  TM distance: >3 FB  Neck ROM: full  No difficulty expected  Dental      Pulmonary - negative pulmonary ROS   Cardiovascular - negative cardio ROS        Neuro/Psych- negative ROS  GI/Hepatic/Renal/Endo - negative ROS     Musculoskeletal (-) negative ROS    Abdominal    Substance History - negative use     OB/GYN    (+) Pregnant,         Other - negative ROS                       Anesthesia Plan    ASA 2 - emergent     spinal and ITN       Anesthetic plan, all risks, benefits, and alternatives have been provided, discussed and informed consent has been obtained with: patient.

## 2020-02-18 NOTE — ANESTHESIA POSTPROCEDURE EVALUATION
Patient: Anette Litnon    Procedure Summary     Date:  20 Room / Location:  [facility] LABOR DELIVERY   [facility]    Anesthesia Start:  417 Anesthesia Stop:  516    Procedure:   SECTION PRIMARY (N/A Abdomen) Diagnosis:      Surgeon:  Elvira Sampson MD Provider:  Prema Lamb DO    Anesthesia Type:  spinal, ITN ASA Status:  2 - Emergent          Anesthesia Type: spinal, ITN    Vitals  Vitals Value Taken Time   /83 2020  2:14 PM   Temp 97.9 °F (36.6 °C) 2020 11:55 AM   Pulse 82 2020  2:14 PM   Resp 18 2020  2:14 PM   SpO2 97 % 2020  2:14 PM           Post Anesthesia Care and Evaluation    Patient location during evaluation: bedside  Patient participation: complete - patient participated  Level of consciousness: awake and alert  Pain management: adequate  Airway patency: patent  Anesthetic complications: No anesthetic complications    Cardiovascular status: acceptable  Respiratory status: acceptable  Hydration status: acceptable  Post Neuraxial Block status: Motor and sensory function returned to baseline and No signs or symptoms of PDPH

## 2020-02-18 NOTE — ANESTHESIA POSTPROCEDURE EVALUATION
Patient: Anette Linton    Procedure Summary     Date:  20 Room / Location:  [facility] LABOR DELIVERY   [facility]    Anesthesia Start:  417 Anesthesia Stop:      Procedure:   SECTION PRIMARY (N/A Abdomen) Diagnosis:      Surgeon:  Elvira Sampson MD Provider:      Anesthesia Type:  spinal, ITN ASA Status:  2 - Emergent          Anesthesia Type: spinal, ITN    Vitals  Vitals Value Taken Time   BP     Temp 97.4 °F (36.3 °C) 2020  5:12 AM   Pulse     Resp 18 2020  5:13 AM   SpO2 98 % 2020  5:14 AM     /88  HR 76      Post Anesthesia Care and Evaluation    Patient location during evaluation: bedside  Patient participation: complete - patient participated  Level of consciousness: awake and awake and alert  Pain score: 0  Pain management: satisfactory to patient  Airway patency: patent  Anesthetic complications: No anesthetic complications  PONV Status: none  Cardiovascular status: acceptable  Respiratory status: acceptable  Hydration status: acceptable  Post Neuraxial Block status: No signs or symptoms of PDPH

## 2020-02-18 NOTE — PROGRESS NOTES
Laborist    Patient seen and examined chart reviewed    Patient feels much better tolerated dinner  Vital signs stable afebrile with good urine output    Viewed labs H&H stable 9.3 and 27.7 platelet count 149,000, fibrinogen normal at 313, and LFTs slightly elevated which are expected.    Continue postop care, will discontinue magnesium sulfate at 4:30 AM.

## 2020-02-19 LAB
BASOPHILS # BLD AUTO: 0.04 10*3/MM3 (ref 0–0.2)
BASOPHILS NFR BLD AUTO: 0.2 % (ref 0–1.5)
CYTO UR: NORMAL
DEPRECATED RDW RBC AUTO: 42.7 FL (ref 37–54)
EOSINOPHIL # BLD AUTO: 0.04 10*3/MM3 (ref 0–0.4)
EOSINOPHIL NFR BLD AUTO: 0.2 % (ref 0.3–6.2)
ERYTHROCYTE [DISTWIDTH] IN BLOOD BY AUTOMATED COUNT: 12.4 % (ref 12.3–15.4)
HCT VFR BLD AUTO: 29.4 % (ref 34–46.6)
HGB BLD-MCNC: 9.9 G/DL (ref 12–15.9)
IMM GRANULOCYTES # BLD AUTO: 0.52 10*3/MM3 (ref 0–0.05)
IMM GRANULOCYTES NFR BLD AUTO: 3 % (ref 0–0.5)
LAB AP CASE REPORT: NORMAL
LAB AP CLINICAL INFORMATION: NORMAL
LYMPHOCYTES # BLD AUTO: 1.49 10*3/MM3 (ref 0.7–3.1)
LYMPHOCYTES NFR BLD AUTO: 8.5 % (ref 19.6–45.3)
MCH RBC QN AUTO: 32.1 PG (ref 26.6–33)
MCHC RBC AUTO-ENTMCNC: 33.7 G/DL (ref 31.5–35.7)
MCV RBC AUTO: 95.5 FL (ref 79–97)
MONOCYTES # BLD AUTO: 1 10*3/MM3 (ref 0.1–0.9)
MONOCYTES NFR BLD AUTO: 5.7 % (ref 5–12)
NEUTROPHILS # BLD AUTO: 14.45 10*3/MM3 (ref 1.7–7)
NEUTROPHILS NFR BLD AUTO: 82.4 % (ref 42.7–76)
NRBC BLD AUTO-RTO: 0.3 /100 WBC (ref 0–0.2)
PATH REPORT.FINAL DX SPEC: NORMAL
PATH REPORT.GROSS SPEC: NORMAL
PLATELET # BLD AUTO: 150 10*3/MM3 (ref 140–450)
PMV BLD AUTO: 10.3 FL (ref 6–12)
RBC # BLD AUTO: 3.08 10*6/MM3 (ref 3.77–5.28)
WBC NRBC COR # BLD: 17.54 10*3/MM3 (ref 3.4–10.8)

## 2020-02-19 PROCEDURE — 85025 COMPLETE CBC W/AUTO DIFF WBC: CPT | Performed by: OBSTETRICS & GYNECOLOGY

## 2020-02-19 RX ORDER — FERROUS SULFATE 325(65) MG
325 TABLET ORAL 2 TIMES DAILY WITH MEALS
Status: DISCONTINUED | OUTPATIENT
Start: 2020-02-19 | End: 2020-02-21 | Stop reason: HOSPADM

## 2020-02-19 RX ADMIN — Medication: at 18:09

## 2020-02-19 RX ADMIN — FAMOTIDINE 20 MG: 20 TABLET ORAL at 10:13

## 2020-02-19 RX ADMIN — DOCUSATE SODIUM 100 MG: 100 CAPSULE, LIQUID FILLED ORAL at 10:13

## 2020-02-19 RX ADMIN — IBUPROFEN 600 MG: 600 TABLET, FILM COATED ORAL at 10:13

## 2020-02-19 RX ADMIN — FERROUS SULFATE TAB 325 MG (65 MG ELEMENTAL FE) 325 MG: 325 (65 FE) TAB at 10:13

## 2020-02-19 RX ADMIN — IBUPROFEN 600 MG: 600 TABLET, FILM COATED ORAL at 18:04

## 2020-02-19 RX ADMIN — SIMETHICONE CHEW TAB 80 MG 80 MG: 80 TABLET ORAL at 18:09

## 2020-02-19 RX ADMIN — DOCUSATE SODIUM 100 MG: 100 CAPSULE, LIQUID FILLED ORAL at 21:36

## 2020-02-19 RX ADMIN — FAMOTIDINE 20 MG: 20 TABLET ORAL at 18:04

## 2020-02-19 RX ADMIN — HYDROCODONE BITARTRATE AND ACETAMINOPHEN 1 TABLET: 5; 325 TABLET ORAL at 00:43

## 2020-02-19 RX ADMIN — FERROUS SULFATE TAB 325 MG (65 MG ELEMENTAL FE) 325 MG: 325 (65 FE) TAB at 18:04

## 2020-02-19 NOTE — LACTATION NOTE
"   02/18/20 1135   Maternal Information   Date of Referral 02/18/20   Person Making Referral physician   Infant Reason for Referral NICU admission   Equipment Type   Breast Pump Type double electric, hospital grade  (nursing staff initiated pumping)   Breast Pump Flange Type hard   Breast Pump Flange Size 24 mm   Reproductive Interventions   Breastfeeding Assistance support offered   Breastfeeding Support diary/feeding log utilized;encouragement provided;lactation counseling provided   Breast Pumping   Breast Pumping Interventions early pumping promoted;frequent pumping encouraged   Coping/Psychosocial Interventions   Parent/Child Attachment Promotion caring behavior modeled;skin-to-skin contact encouraged;positive reinforcement provided;strengths emphasized   Supportive Measures active listening utilized   Encouraged to pump every 3 hours to get best milk supply possible. Encouraged to watch online video by [employer], \"Maximizing Milk Production\" that demonstrates how to use hands to improve milk expression. Gave microwave steam bags and instructed to sterilize pump parts every 24 hours while baby is in NICU. Teaching done, as documented under Education. To call lactation services, if there are questions or concerns.   "

## 2020-02-19 NOTE — PLAN OF CARE
Problem: Breastfeeding (Adult,Obstetrics,Pediatric)  Goal: Signs and Symptoms of Listed Potential Problems Will be Absent, Minimized or Managed (Breastfeeding)  Outcome: Ongoing (interventions implemented as appropriate)  Flowsheets (Taken 2/18/2020 1135)  Problems Assessed (Breastfeeding): pain; situational response; skin breakdown  Intervention: Provide Support During Feeding Sessions  Flowsheets (Taken 2/18/2020 1135)  Parent/Child Attachment Promotion: caring behavior modeled; skin-to-skin contact encouraged; positive reinforcement provided; strengths emphasized  Intervention: Promote Positive Maternal Experience  Flowsheets (Taken 2/18/2020 1135)  Supportive Measures: active listening utilized  Breastfeeding Support: diary/feeding log utilized; encouragement provided; lactation counseling provided  Intervention: Support Exclusive Breastfeeding Success  Flowsheets (Taken 2/18/2020 1135)  Breastfeeding Assistance: support offered  Note:   Nursing staff initiated pumping

## 2020-02-20 RX ADMIN — IBUPROFEN 600 MG: 600 TABLET, FILM COATED ORAL at 23:22

## 2020-02-20 RX ADMIN — FERROUS SULFATE TAB 325 MG (65 MG ELEMENTAL FE) 325 MG: 325 (65 FE) TAB at 08:26

## 2020-02-20 RX ADMIN — DOCUSATE SODIUM 100 MG: 100 CAPSULE, LIQUID FILLED ORAL at 08:26

## 2020-02-20 RX ADMIN — IBUPROFEN 600 MG: 600 TABLET, FILM COATED ORAL at 06:04

## 2020-02-20 RX ADMIN — IBUPROFEN 600 MG: 600 TABLET, FILM COATED ORAL at 12:26

## 2020-02-20 RX ADMIN — IBUPROFEN 600 MG: 600 TABLET, FILM COATED ORAL at 00:05

## 2020-02-20 RX ADMIN — FERROUS SULFATE TAB 325 MG (65 MG ELEMENTAL FE) 325 MG: 325 (65 FE) TAB at 18:14

## 2020-02-20 RX ADMIN — DOCUSATE SODIUM 100 MG: 100 CAPSULE, LIQUID FILLED ORAL at 20:47

## 2020-02-20 RX ADMIN — SIMETHICONE CHEW TAB 80 MG 80 MG: 80 TABLET ORAL at 08:26

## 2020-02-20 RX ADMIN — FAMOTIDINE 20 MG: 20 TABLET ORAL at 18:14

## 2020-02-20 RX ADMIN — FAMOTIDINE 20 MG: 20 TABLET ORAL at 08:26

## 2020-02-20 RX ADMIN — IBUPROFEN 600 MG: 600 TABLET, FILM COATED ORAL at 18:14

## 2020-02-20 NOTE — LACTATION NOTE
02/20/20 1110   Maternal Information   Date of Referral 02/20/20   Person Making Referral other (see comments)  (courtesy)   Maternal Infant Feeding   Maternal Emotional State independent;relaxed   Equipment Type   Breast Pump Type double electric, hospital grade     Mom is pumping every 3h. Discussed the supply and demand system. Discussed when milk to be expected to come in. Reinforced how to store milk, and clean pump parts. Enc to call for asst if needed.

## 2020-02-20 NOTE — PROGRESS NOTES
Jennifer   PROGRESS NOTE    Post-Op Day 2 S/P   Subjective     Patient reports:  Pain is  controlled with .  She is  ambulating. Tolerating diet. Tolerating po -- normal.  Intake -- c/o of tolerating po solids.   Voiding - without difficulty; flatus reported..  Vaginal bleeding is as much as expected.      Objective      Vitals: Vital Signs Range for the last 24 hours  Temperature: Temp:  [97.9 °F (36.6 °C)-99.5 °F (37.5 °C)] 98.3 °F (36.8 °C)   Temp Source: Temp src: Oral   BP: BP: (125-141)/(58-87) 139/83   Pulse: Heart Rate:  [] 92   Respirations: Resp:  [14-18] 18   SPO2: SpO2:  [97 %-100 %] 100 %   O2 Amount (l/min):     O2 Devices     Weight:              Physical Exam    Lungs clear to auscultation bilaterally   Abdomen Soft, non-tender, normal bowel sounds; no bruits, organomegaly or masses.   Incision  healing well   Extremities extremities normal, atraumatic, no cyanosis or edema     I reviewed the patient's new clinical results.    Assessment/Plan        Preeclampsia      Assessment:    Anette Linton is Day 2  post-partum  , Low Transverse    .       Plan:  continue post op care.        Dariel Aragon MD  2020  9:19 AM

## 2020-02-20 NOTE — LACTATION NOTE
02/19/20 2050   Maternal Information   Person Making Referral   (fu consult)   Maternal Reason for Referral   (trying to pump every 3 hours)   Infant Reason for Referral NICU admission   Equipment Type   Breast Pump Type double electric, hospital grade;double electric, personal  (using hospital pump; gave rx/instructions for home pump)   Breast Pump Flange Type hard   Breast Pump Flange Size 24 mm   Reproductive Interventions   Breastfeeding Assistance support offered   Breastfeeding Support encouragement provided;lactation counseling provided   Breast Pumping   Breast Pumping Interventions early pumping promoted;frequent pumping encouraged   Encouraged mom to pump every 3 hours to get best milk supply. Reviewed pump use. Gave mom Pump for [employer] contract. Discussed terms of contract, including that this is a loan pump to use while baby is in NICU and must be returned a few days before baby goes home. Explained how to get home breast pump. Also gave mom handout and instructions to get home pump through insurance--to use after baby goes home from NICU. Teaching reviewed. To call lactation services, if there are questions or concerns.

## 2020-02-20 NOTE — PLAN OF CARE
VSS, fundus and lochia wdl, surgical incision ELADIO , shows no s/s of infection, pain has been controlled with scheduled motrin.

## 2020-02-21 VITALS
DIASTOLIC BLOOD PRESSURE: 83 MMHG | BODY MASS INDEX: 39.65 KG/M2 | HEART RATE: 90 BPM | HEIGHT: 61 IN | TEMPERATURE: 97.9 F | WEIGHT: 210 LBS | SYSTOLIC BLOOD PRESSURE: 137 MMHG | OXYGEN SATURATION: 97 % | RESPIRATION RATE: 18 BRPM

## 2020-02-21 PROBLEM — O14.90 PREECLAMPSIA: Status: RESOLVED | Noted: 2020-02-14 | Resolved: 2020-02-21

## 2020-02-21 RX ORDER — HYDROCODONE BITARTRATE AND ACETAMINOPHEN 5; 325 MG/1; MG/1
1 TABLET ORAL EVERY 4 HOURS PRN
Qty: 24 TABLET | Refills: 0 | Status: SHIPPED | OUTPATIENT
Start: 2020-02-21 | End: 2020-02-28

## 2020-02-21 RX ORDER — IBUPROFEN 600 MG/1
600 TABLET ORAL EVERY 6 HOURS SCHEDULED
Qty: 25 TABLET | Refills: 2 | Status: SHIPPED | OUTPATIENT
Start: 2020-02-21 | End: 2022-06-07

## 2020-02-21 RX ADMIN — FERROUS SULFATE TAB 325 MG (65 MG ELEMENTAL FE) 325 MG: 325 (65 FE) TAB at 08:07

## 2020-02-21 RX ADMIN — IBUPROFEN 600 MG: 600 TABLET, FILM COATED ORAL at 11:53

## 2020-02-21 RX ADMIN — IBUPROFEN 600 MG: 600 TABLET, FILM COATED ORAL at 06:12

## 2020-02-21 RX ADMIN — DOCUSATE SODIUM 100 MG: 100 CAPSULE, LIQUID FILLED ORAL at 08:07

## 2020-02-21 RX ADMIN — FAMOTIDINE 20 MG: 20 TABLET ORAL at 08:07

## 2021-08-24 ENCOUNTER — OFFICE VISIT (OUTPATIENT)
Dept: OBSTETRICS AND GYNECOLOGY | Facility: CLINIC | Age: 31
End: 2021-08-24

## 2021-08-24 VITALS
WEIGHT: 172.6 LBS | DIASTOLIC BLOOD PRESSURE: 80 MMHG | SYSTOLIC BLOOD PRESSURE: 116 MMHG | BODY MASS INDEX: 32.59 KG/M2 | HEIGHT: 61 IN

## 2021-08-24 DIAGNOSIS — Z01.419 WOMEN'S ANNUAL ROUTINE GYNECOLOGICAL EXAMINATION: Primary | ICD-10-CM

## 2021-08-24 DIAGNOSIS — N80.9 ENDOMETRIOSIS: ICD-10-CM

## 2021-08-24 PROCEDURE — 99395 PREV VISIT EST AGE 18-39: CPT | Performed by: OBSTETRICS & GYNECOLOGY

## 2021-08-24 RX ORDER — DROSPIRENONE 4 MG/1
1 TABLET, FILM COATED ORAL DAILY
Qty: 28 TABLET | Refills: 11 | Status: SHIPPED | OUTPATIENT
Start: 2021-08-24

## 2021-08-24 NOTE — PROGRESS NOTES
GYN Annual Exam     CC - Here for annual exam.        HPI  Anette Linton is a 31 y.o. female, , who presents for annual well woman exam. Patient's last menstrual period was 08/10/2021..  Periods are regular every 25-35 days, lasting 3 days. .  Dysmenorrhea:severe, occurring throughout cycle.  Patient reports problems with: back pain.  There were no changes to her medical or surgical history since her last visit.. Partner Status: Marital Status: .  She is sexually active. She has not had new partners since her last STD testing. She does not desire STD testing. .    Patient reports she has some severe abdominal cramping and some back pain that is going up to her shoulders. She has endometriosis and a h/o ovarian cysts. She states this is the worst pain she has had due to this in the last 4-5 years.     Additional OB/GYN History   Current contraception: contraceptive methods: None  Desires to: do not start contraception  Last Pap :   Last Completed Pap Smear          Ordered - PAP SMEAR (Every 3 Years) Ordered on 2020  Done - Negative    2019  SCANNED - PAP SMEAR    10/10/2017  SCANNED - PAP SMEAR    2016  Liquid-based pap smear, screening              History of abnormal Pap smear: no  Family history of uterine, colon, breast, or ovarian cancer: no  Performs monthly Self-Breast Exam: no  Exercises Regularly:yes  Feelings of Anxiety or Depression: yes - anxiety  Tobacco Usage?: No   OB History        1    Para   1    Term   0       1    AB   0    Living   1       SAB   0    TAB   0    Ectopic   0    Molar   0    Multiple   0    Live Births   1                Health Maintenance   Topic Date Due   • ANNUAL PHYSICAL  Never done   • COVID-19 Vaccine (1) Never done   • TDAP/TD VACCINES (1 - Tdap) Never done   • HEPATITIS C SCREENING  Never done   • Annual Gynecologic Pelvic and Breast Exam  2020   • INFLUENZA VACCINE  10/01/2021   • PAP SMEAR  2023   •  "Pneumococcal Vaccine 0-64  Aged Out       The additional following portions of the patient's history were reviewed and updated as appropriate: allergies, current medications, past family history, past medical history, past social history and past surgical history.    Review of Systems   Constitutional: Negative.    HENT: Negative.    Eyes: Negative.    Respiratory: Negative.    Cardiovascular: Negative.    Gastrointestinal: Negative.    Endocrine: Negative.    Genitourinary: Positive for pelvic pain.   Musculoskeletal: Negative.    Skin: Negative.    Allergic/Immunologic: Negative.    Neurological: Negative.    Hematological: Negative.    Psychiatric/Behavioral: Negative.          I have reviewed and agree with the HPI, ROS, and historical information as entered above. Dariel Aragon MD    Objective   /80   Ht 154.9 cm (61\")   Wt 78.3 kg (172 lb 9.6 oz)   LMP 08/10/2021   BMI 32.61 kg/m²     Physical Exam  Vitals and nursing note reviewed. Exam conducted with a chaperone present.   Constitutional:       Appearance: She is well-developed.   HENT:      Head: Normocephalic and atraumatic.   Neck:      Thyroid: No thyroid mass or thyromegaly.   Cardiovascular:      Rate and Rhythm: Normal rate and regular rhythm.      Heart sounds: No murmur heard.     Pulmonary:      Effort: Pulmonary effort is normal. No retractions.      Breath sounds: Normal breath sounds. No wheezing, rhonchi or rales.   Chest:      Chest wall: No mass or tenderness.      Breasts:         Right: Normal. No mass, nipple discharge, skin change or tenderness.         Left: Normal. No mass, nipple discharge, skin change or tenderness.   Abdominal:      General: Bowel sounds are normal.      Palpations: Abdomen is soft. Abdomen is not rigid. There is no mass.      Tenderness: There is no abdominal tenderness. There is no guarding.      Hernia: No hernia is present. There is no hernia in the left inguinal area.   Genitourinary:     Labia:       "   Right: No rash, tenderness or lesion.         Left: No rash, tenderness or lesion.       Vagina: Normal. No vaginal discharge or lesions.      Cervix: No cervical motion tenderness, discharge, lesion or cervical bleeding.      Uterus: Normal. Not enlarged, not fixed and not tender.       Adnexa:         Right: No mass or tenderness.          Left: No mass or tenderness.        Rectum: No external hemorrhoid.   Musculoskeletal:      Cervical back: Normal range of motion. No muscular tenderness.   Neurological:      Mental Status: She is alert and oriented to person, place, and time.   Psychiatric:         Behavior: Behavior normal.            Assessment and Plan    Problem List Items Addressed This Visit     None      Visit Diagnoses     Women's annual routine gynecological examination    -  Primary    Relevant Orders    IGP, Rfx Aptima HPV ASCU    Endometriosis        Relevant Medications    Drospirenone (Slynd) 4 MG tablet          1. GYN annual well woman exam.   2. Preconceptual Counseling.  Discussed timed intercourse, regular cycles, prenatal vitamins, and when to call.  3. Reviewed monthly self breast exams.  Instructed to call with lumps, pain, or breast discharge.    4. Reviewed BMI and weight loss as preventative health measures.   5. Reviewed exercise as a preventative health measures.   6. Other: will rx for endometriosis and then she will poss pregnat--poss lap if n better      Dariel Aragon MD  08/24/2021

## 2022-05-31 ENCOUNTER — OFFICE VISIT (OUTPATIENT)
Dept: OBSTETRICS AND GYNECOLOGY | Facility: CLINIC | Age: 32
End: 2022-05-31

## 2022-05-31 VITALS
WEIGHT: 168 LBS | HEIGHT: 61 IN | DIASTOLIC BLOOD PRESSURE: 84 MMHG | BODY MASS INDEX: 31.72 KG/M2 | SYSTOLIC BLOOD PRESSURE: 114 MMHG

## 2022-05-31 DIAGNOSIS — N80.9 ENDOMETRIOSIS: Primary | ICD-10-CM

## 2022-05-31 PROCEDURE — 99212 OFFICE O/P EST SF 10 MIN: CPT | Performed by: OBSTETRICS & GYNECOLOGY

## 2022-05-31 NOTE — PROGRESS NOTES
"Chief Complaint   Patient presents with   • Follow-up     endometriosis       Subjective   HPI  Anette Linton is a 32 y.o. female, , who presents for follow up endometriosis.      Patient states that since her last visit in 2021, she has noticed she has more flare ups and the pain is worse. Patient reports that this causes a lot of pain with intercourse, and they have put off trying to conceive due to the pain. Patient states that once her flare up is on the mend, she reports that she feels down and unlike herself. Patient reports that she is overwhelmed and \"letting everyone down.\" Patient does not have a history of depression. Patient would like to discuss a lap procedure prior to conceiving. Patient reports that she has had 2 prior laps and they were both done prior to her having her first child. Patient states her last lap was about 6 years ago with Dr. Obrien.     Her last LMP was Patient's last menstrual period was 2022 (within days).  Periods are regular every 28-30 days, lasting 2-4 days.  Dysmenorrhea:moderate to severe occurring premenstrually. Patient reports that some months she will have heavy bleeding and other months, she will just have some spotting. Patient reports problems with: none.  Partner Status: Marital Status: .  New Partners since last visit: no.  Desires STD Screening: no.    Additional OB/GYN History   Current contraception: contraceptive methods: None  Desires to: do not start contraception  Last Pap :   Last Completed Pap Smear          PAP SMEAR (Every 3 Years) Next due on 2021  IGP, Rfx Aptima HPV ASCU    2020  Done - Negative    2019  SCANNED - PAP SMEAR    10/10/2017  SCANNED - PAP SMEAR    2016  Liquid-based pap smear, screening              History of abnormal Pap smear: no  Last mammogram: Never   Last Completed Mammogram     This patient has no relevant Health Maintenance data.        Tobacco Usage?: No   OB History  " "      1    Para   1    Term   0       1    AB   0    Living   1       SAB   0    IAB   0    Ectopic   0    Molar   0    Multiple   0    Live Births   1                Health Maintenance   Topic Date Due   • ANNUAL PHYSICAL  Never done   • COVID-19 Vaccine (1) Never done   • TDAP/TD VACCINES (1 - Tdap) Never done   • HEPATITIS C SCREENING  Never done   • INFLUENZA VACCINE  2022   • Annual Gynecologic Pelvic and Breast Exam  2022   • PAP SMEAR  2024   • Pneumococcal Vaccine 0-64  Aged Out       The additional following portions of the patient's history were reviewed and updated as appropriate: allergies and current medications.    Review of Systems    I have reviewed and agree with the HPI, ROS, and historical information as entered above. Dariel Aragon MD    Objective   /84 (BP Location: Left arm, Patient Position: Sitting, Cuff Size: Adult)   Ht 154.9 cm (61\")   Wt 76.2 kg (168 lb)   LMP 2022 (Within Days)   Breastfeeding No   BMI 31.74 kg/m²     Physical Exam not done     Assessment & Plan     Assessment     Problem List Items Addressed This Visit    None     Visit Diagnoses     Endometriosis    -  Primary    Relevant Orders    External Facility Surgical/Procedural Request          1. Probable endometriosis    Plan     No follow-ups on file.  1. Will do 3 rd laser lap - Hysterectomy not desiresd at this time      Dariel Aragon MD  2022  "

## 2022-06-07 ENCOUNTER — OFFICE VISIT (OUTPATIENT)
Dept: OBSTETRICS AND GYNECOLOGY | Facility: CLINIC | Age: 32
End: 2022-06-07

## 2022-06-07 VITALS
HEIGHT: 61 IN | SYSTOLIC BLOOD PRESSURE: 114 MMHG | DIASTOLIC BLOOD PRESSURE: 84 MMHG | WEIGHT: 167.4 LBS | BODY MASS INDEX: 31.6 KG/M2

## 2022-06-07 DIAGNOSIS — R10.2 PELVIC PAIN: ICD-10-CM

## 2022-06-07 DIAGNOSIS — Z01.818 PREOPERATIVE EXAMINATION: ICD-10-CM

## 2022-06-07 DIAGNOSIS — N80.9 ENDOMETRIOSIS: Primary | ICD-10-CM

## 2022-06-07 LAB
ERYTHROCYTE [DISTWIDTH] IN BLOOD BY AUTOMATED COUNT: 11.6 % (ref 12.3–15.4)
HCG INTACT+B SERPL-ACNC: <0.5 MIU/ML
HCT VFR BLD AUTO: 46.6 % (ref 34–46.6)
HGB BLD-MCNC: 15.7 G/DL (ref 12–15.9)
MCH RBC QN AUTO: 30.1 PG (ref 26.6–33)
MCHC RBC AUTO-ENTMCNC: 33.7 G/DL (ref 31.5–35.7)
MCV RBC AUTO: 89.3 FL (ref 79–97)
PLATELET # BLD AUTO: 338 10*3/MM3 (ref 140–450)
RBC # BLD AUTO: 5.22 10*6/MM3 (ref 3.77–5.28)
WBC # BLD AUTO: 7.33 10*3/MM3 (ref 3.4–10.8)

## 2022-06-07 PROCEDURE — 99214 OFFICE O/P EST MOD 30 MIN: CPT | Performed by: OBSTETRICS & GYNECOLOGY

## 2022-06-07 NOTE — PROGRESS NOTES
Gynecologic Preoperative Exam Note        Subjective   Anette Linton is a 32 y.o. year old  who is scheduled  for surgery due to endometriosis.  She is scheduled for diagnostic laparoscopy with laser at [facility].  Her surgery is scheduled for 06/15/2022 at 9am.   Her LMP was 2022.  Her birth control method is OCP,  Her BMI is 31.74.      She has reviewed the informational video on .    She has reviewed the informational pamphlet on lap.    She understands the risks of bleeding, infection, possible damage to other organ systems, including but not limited to the gastrointestinal tract and genitourinary tract.  She also understands the specific risks listed in the preop information (video, pamphlets, etc.).    She has reviewed and signed the preop consent form.    She has been instructed to have a light dinner the night before surgery, then nothing to eat or drink after midnight.  The day of surgery do not chew gum or smoke.  Remove all jewelry, nail polish, contact lenses prior to coming to the hospital.  Do not bring valuables or large sums of money with you. Patient was instructed on what time to arrive and where to check in, maps were given.  She was instructed that she will meet an Anesthesiologist and that an IV will be started to provide fluids and sedation.  The total time of procedure was discussed.  She was instructed that she will need a .      She has confirmed that she is NOT allergic to Latex.       Past Medical History:   Diagnosis Date   • Endometriosis    • Gestational hypertension    • Migraine    • Ovarian cyst      Past Surgical History:   Procedure Laterality Date   •  SECTION N/A 2020    Procedure:  SECTION PRIMARY;  Surgeon: Elviar Sampson MD;  Location: [facility] LABOR DELIVERY;  Service: Obstetrics;  Laterality: N/A;   • DIAGNOSTIC LAPAROSCOPY       OB History    Para Term  AB Living   1 1 0 1 0 1   SAB IAB Ectopic Molar Multiple  "Live Births   0 0 0 0 0 1      # Outcome Date GA Lbr Yaniv/2nd Weight Sex Delivery Anes PTL Lv   1  20 33w4d  1850 g (4 lb 1.3 oz) M CS-LTranv Spinal N EPHRAIM      Complications: Abruptio Placenta      Name: MADAY WILLARD      Apgar1: 6  Apgar5: 8     Social History     Tobacco Use   Smoking Status Never Smoker   Smokeless Tobacco Never Used     Social History     Substance and Sexual Activity   Alcohol Use Yes    Comment: OCCASIONAL-prior to pregnancy     Social History     Substance and Sexual Activity   Drug Use No     Prior to Admission medications    Medication Sig Start Date End Date Taking? Authorizing Provider   Drospirenone (Slynd) 4 MG tablet Take 1 tablet by mouth Daily. 21   Dariel Aragon MD   ibuprofen (ADVIL,MOTRIN) 600 MG tablet Take 1 tablet by mouth Every 6 (Six) Hours. 20   Dariel Aragon MD     No Known Allergies    Review of Systems      Objective   /84 (BP Location: Left arm, Patient Position: Sitting, Cuff Size: Adult)   Ht 154.9 cm (61\")   Wt 75.9 kg (167 lb 6.4 oz)   LMP 2022 (Within Days)   BMI 31.63 kg/m²   General: well developed; well nourished  no acute distress   Heart: Not performed.   Lungs: breathing is unlabored   Abdomen: soft, non-tender; no masses  no umbilical or inguinal hernias are present  no hepato-splenomegaly   Pelvis: Clinical staff was present for exam  :  urethral meatus normal;  Vaginal:  normal pink mucosa without prolapse or lesions.  Cervix:  normal appearance.  Uterus:  normal size, shape and consistency.  Adnexa:  normal bimanual exam of the adnexa. tenderness of the bilateral adnexa to  superficial palpation;        Assessment   Problems Addressed this Visit    None     Visit Diagnoses     Endometriosis    -  Primary    Pelvic pain        Preoperative examination        Relevant Orders    CBC (No Diff)    HCG, B-subunit, Quantitative      Diagnoses       Codes Comments    Endometriosis    -  Primary ICD-10-CM: " N80.9  ICD-9-CM: 617.9     Pelvic pain     ICD-10-CM: R10.2  ICD-9-CM: GCT2161     Preoperative examination     ICD-10-CM: Z01.818  ICD-9-CM: V72.84                Plan   1. Dx Laparoscopy and ablate endometriosis  2. Risks of surgery were reviewed with the patient including risks of bleeding, infection, damage to other organ systems including, but not limited to GI and  tracts (bowel, bladder, blood vessels, nerves) risks of Anesthesia, as well as the risk the surgery will not produce the desired results, possible need for additional surgery, death, risk of uterine perforation.  3. PAT Scheduled    4. Ronald has been obtained and reviewed   5. Pain Medication Consent Form has been signed.  A review regarding proper medication administration, impact on driving and working while medicated, the safety of use in pregnancy, the potential for overdose and the proper disposal and storage of controlled medications has been done with the patient.          Dariel Aragon MD  6/7/2022

## 2022-06-12 ENCOUNTER — APPOINTMENT (OUTPATIENT)
Dept: PREADMISSION TESTING | Facility: HOSPITAL | Age: 32
End: 2022-06-12

## 2022-06-12 LAB — SARS-COV-2 RNA PNL SPEC NAA+PROBE: NOT DETECTED

## 2022-06-12 PROCEDURE — U0004 COV-19 TEST NON-CDC HGH THRU: HCPCS

## 2022-06-15 ENCOUNTER — PREP FOR SURGERY (OUTPATIENT)
Dept: OTHER | Facility: HOSPITAL | Age: 32
End: 2022-06-15

## 2022-06-15 ENCOUNTER — OUTSIDE FACILITY SERVICE (OUTPATIENT)
Dept: OBSTETRICS AND GYNECOLOGY | Facility: CLINIC | Age: 32
End: 2022-06-15

## 2022-06-15 DIAGNOSIS — G89.18 POSTOPERATIVE PAIN: Primary | ICD-10-CM

## 2022-06-15 PROCEDURE — 58662 LAPAROSCOPY EXCISE LESIONS: CPT | Performed by: OBSTETRICS & GYNECOLOGY

## 2022-06-15 RX ORDER — IBUPROFEN 600 MG/1
600 TABLET ORAL EVERY 6 HOURS PRN
Qty: 25 TABLET | Refills: 2 | Status: SHIPPED | OUTPATIENT
Start: 2022-06-15

## 2022-06-15 RX ORDER — OXYCODONE HYDROCHLORIDE AND ACETAMINOPHEN 5; 325 MG/1; MG/1
1 TABLET ORAL EVERY 4 HOURS PRN
Qty: 20 TABLET | Refills: 0 | Status: SHIPPED | OUTPATIENT
Start: 2022-06-15 | End: 2022-07-26

## 2022-07-18 ENCOUNTER — HOSPITAL ENCOUNTER (EMERGENCY)
Facility: HOSPITAL | Age: 32
Discharge: HOME | End: 2022-07-19
Attending: EMERGENCY MEDICINE | Admitting: EMERGENCY MEDICINE

## 2022-07-18 DIAGNOSIS — N80.9 ENDOMETRIOSIS: Primary | ICD-10-CM

## 2022-07-18 DIAGNOSIS — R10.84 DIFFUSE ABDOMINAL PAIN: ICD-10-CM

## 2022-07-18 LAB
ALBUMIN SERPL-MCNC: 4.8 G/DL (ref 3.5–5.2)
ALBUMIN/GLOB SERPL: 1.4 G/DL
ALP SERPL-CCNC: 48 U/L (ref 39–117)
ALT SERPL W P-5'-P-CCNC: 28 U/L (ref 1–33)
ANION GAP SERPL CALCULATED.3IONS-SCNC: 9 MMOL/L (ref 5–15)
AST SERPL-CCNC: 36 U/L (ref 1–32)
BASOPHILS # BLD AUTO: 0.07 10*3/MM3 (ref 0–0.2)
BASOPHILS NFR BLD AUTO: 0.7 % (ref 0–1.5)
BILIRUB SERPL-MCNC: 0.4 MG/DL (ref 0–1.2)
BUN SERPL-MCNC: 7 MG/DL (ref 6–20)
BUN/CREAT SERPL: 8.3 (ref 7–25)
CALCIUM SPEC-SCNC: 9.4 MG/DL (ref 8.6–10.5)
CHLORIDE SERPL-SCNC: 102 MMOL/L (ref 98–107)
CO2 SERPL-SCNC: 25 MMOL/L (ref 22–29)
CREAT SERPL-MCNC: 0.84 MG/DL (ref 0.57–1)
DEPRECATED RDW RBC AUTO: 36.5 FL (ref 37–54)
EGFRCR SERPLBLD CKD-EPI 2021: 95.4 ML/MIN/1.73
EOSINOPHIL # BLD AUTO: 0.2 10*3/MM3 (ref 0–0.4)
EOSINOPHIL NFR BLD AUTO: 2 % (ref 0.3–6.2)
ERYTHROCYTE [DISTWIDTH] IN BLOOD BY AUTOMATED COUNT: 11.4 % (ref 12.3–15.4)
GLOBULIN UR ELPH-MCNC: 3.5 GM/DL
GLUCOSE SERPL-MCNC: 93 MG/DL (ref 65–99)
HCT VFR BLD AUTO: 45.4 % (ref 34–46.6)
HGB BLD-MCNC: 15.7 G/DL (ref 12–15.9)
HOLD SPECIMEN: NORMAL
HOLD SPECIMEN: NORMAL
IMM GRANULOCYTES # BLD AUTO: 0.02 10*3/MM3 (ref 0–0.05)
IMM GRANULOCYTES NFR BLD AUTO: 0.2 % (ref 0–0.5)
LIPASE SERPL-CCNC: 25 U/L (ref 13–60)
LYMPHOCYTES # BLD AUTO: 2.45 10*3/MM3 (ref 0.7–3.1)
LYMPHOCYTES NFR BLD AUTO: 24.6 % (ref 19.6–45.3)
MCH RBC QN AUTO: 30.8 PG (ref 26.6–33)
MCHC RBC AUTO-ENTMCNC: 34.6 G/DL (ref 31.5–35.7)
MCV RBC AUTO: 89 FL (ref 79–97)
MONOCYTES # BLD AUTO: 0.7 10*3/MM3 (ref 0.1–0.9)
MONOCYTES NFR BLD AUTO: 7 % (ref 5–12)
NEUTROPHILS NFR BLD AUTO: 6.53 10*3/MM3 (ref 1.7–7)
NEUTROPHILS NFR BLD AUTO: 65.5 % (ref 42.7–76)
NRBC BLD AUTO-RTO: 0 /100 WBC (ref 0–0.2)
PLATELET # BLD AUTO: 327 10*3/MM3 (ref 140–450)
PMV BLD AUTO: 9.1 FL (ref 6–12)
POTASSIUM SERPL-SCNC: 4.9 MMOL/L (ref 3.5–5.2)
PROT SERPL-MCNC: 8.3 G/DL (ref 6–8.5)
RBC # BLD AUTO: 5.1 10*6/MM3 (ref 3.77–5.28)
SODIUM SERPL-SCNC: 136 MMOL/L (ref 136–145)
WBC NRBC COR # BLD: 9.97 10*3/MM3 (ref 3.4–10.8)
WHOLE BLOOD HOLD COAG: NORMAL
WHOLE BLOOD HOLD SPECIMEN: NORMAL

## 2022-07-18 PROCEDURE — 99283 EMERGENCY DEPT VISIT LOW MDM: CPT

## 2022-07-18 PROCEDURE — 85025 COMPLETE CBC W/AUTO DIFF WBC: CPT

## 2022-07-18 PROCEDURE — 80053 COMPREHEN METABOLIC PANEL: CPT | Performed by: EMERGENCY MEDICINE

## 2022-07-18 PROCEDURE — 81025 URINE PREGNANCY TEST: CPT | Performed by: EMERGENCY MEDICINE

## 2022-07-18 PROCEDURE — 83690 ASSAY OF LIPASE: CPT

## 2022-07-18 RX ORDER — SODIUM CHLORIDE 9 MG/ML
10 INJECTION INTRAVENOUS AS NEEDED
Status: DISCONTINUED | OUTPATIENT
Start: 2022-07-18 | End: 2022-07-19 | Stop reason: HOSPADM

## 2022-07-18 RX ORDER — MORPHINE SULFATE 4 MG/ML
4 INJECTION, SOLUTION INTRAMUSCULAR; INTRAVENOUS ONCE
Status: COMPLETED | OUTPATIENT
Start: 2022-07-18 | End: 2022-07-19

## 2022-07-18 RX ORDER — KETOROLAC TROMETHAMINE 15 MG/ML
15 INJECTION, SOLUTION INTRAMUSCULAR; INTRAVENOUS ONCE
Status: COMPLETED | OUTPATIENT
Start: 2022-07-18 | End: 2022-07-19

## 2022-07-18 RX ORDER — ONDANSETRON 2 MG/ML
4 INJECTION INTRAMUSCULAR; INTRAVENOUS ONCE
Status: COMPLETED | OUTPATIENT
Start: 2022-07-18 | End: 2022-07-19

## 2022-07-19 VITALS
HEART RATE: 87 BPM | BODY MASS INDEX: 30.36 KG/M2 | DIASTOLIC BLOOD PRESSURE: 77 MMHG | TEMPERATURE: 98.3 F | RESPIRATION RATE: 16 BRPM | WEIGHT: 165 LBS | OXYGEN SATURATION: 96 % | SYSTOLIC BLOOD PRESSURE: 126 MMHG | HEIGHT: 62 IN

## 2022-07-19 LAB
B-HCG UR QL: NEGATIVE
BILIRUB UR QL STRIP: NEGATIVE
CLARITY UR: CLEAR
COLOR UR: YELLOW
EXPIRATION DATE: ABNORMAL
GLUCOSE UR STRIP-MCNC: NEGATIVE MG/DL
HGB UR QL STRIP.AUTO: NEGATIVE
HOLD SPECIMEN: NORMAL
INTERNAL NEGATIVE CONTROL: NEGATIVE
INTERNAL POSITIVE CONTROL: POSITIVE
KETONES UR QL STRIP: ABNORMAL
LEUKOCYTE ESTERASE UR QL STRIP.AUTO: NEGATIVE
Lab: ABNORMAL
NITRITE UR QL STRIP: NEGATIVE
PH UR STRIP.AUTO: 5.5 [PH] (ref 5–8)
PROT UR QL STRIP: NEGATIVE
SP GR UR STRIP: 1.01 (ref 1–1.03)
UROBILINOGEN UR QL STRIP: ABNORMAL

## 2022-07-19 PROCEDURE — 81003 URINALYSIS AUTO W/O SCOPE: CPT | Performed by: EMERGENCY MEDICINE

## 2022-07-19 PROCEDURE — 25010000002 KETOROLAC TROMETHAMINE PER 15 MG: Performed by: EMERGENCY MEDICINE

## 2022-07-19 PROCEDURE — 25010000002 MORPHINE PER 10 MG: Performed by: EMERGENCY MEDICINE

## 2022-07-19 PROCEDURE — 96375 TX/PRO/DX INJ NEW DRUG ADDON: CPT

## 2022-07-19 PROCEDURE — 96374 THER/PROPH/DIAG INJ IV PUSH: CPT

## 2022-07-19 PROCEDURE — 25010000002 ONDANSETRON PER 1 MG: Performed by: EMERGENCY MEDICINE

## 2022-07-19 RX ORDER — OXYCODONE AND ACETAMINOPHEN 7.5; 325 MG/1; MG/1
1 TABLET ORAL EVERY 6 HOURS PRN
Qty: 15 TABLET | Refills: 0 | Status: SHIPPED | OUTPATIENT
Start: 2022-07-19 | End: 2022-07-26

## 2022-07-19 RX ORDER — ONDANSETRON 4 MG/1
4 TABLET, ORALLY DISINTEGRATING ORAL EVERY 6 HOURS PRN
Qty: 15 TABLET | Refills: 0 | Status: SHIPPED | OUTPATIENT
Start: 2022-07-19 | End: 2022-07-26

## 2022-07-19 RX ORDER — DICLOFENAC SODIUM 75 MG/1
75 TABLET, DELAYED RELEASE ORAL 2 TIMES DAILY
Qty: 14 TABLET | Refills: 0 | Status: SHIPPED | OUTPATIENT
Start: 2022-07-19 | End: 2022-07-26

## 2022-07-19 RX ADMIN — KETOROLAC TROMETHAMINE 15 MG: 15 INJECTION, SOLUTION INTRAMUSCULAR; INTRAVENOUS at 00:46

## 2022-07-19 RX ADMIN — MORPHINE SULFATE 4 MG: 4 INJECTION, SOLUTION INTRAMUSCULAR; INTRAVENOUS at 00:46

## 2022-07-19 RX ADMIN — ONDANSETRON 4 MG: 2 INJECTION INTRAMUSCULAR; INTRAVENOUS at 00:45

## 2022-07-19 NOTE — ED PROVIDER NOTES
Subjective   Pt presents with abdominal pain. Diffuse pain, radiates to back and shoulders. Onset yesterday, worse today.  Associated with nausea and a bit of vomiting. No fever, diarrhea, dysuria, vaginal bleeding/discharge.    History of endometriosis and this feels like her prior pains with that.  She has had laparoscopy x3 most recently about one month ago by Dr Aragon, she was told he got it all at that time.  She is not on hormones currently but has been on them in the past with no real improvement.          Review of Systems   Constitutional: Negative for fever.   Gastrointestinal: Positive for abdominal pain, nausea and vomiting. Negative for diarrhea.   Genitourinary: Negative for dysuria, vaginal bleeding and vaginal discharge.   All other systems reviewed and are negative.      Past Medical History:   Diagnosis Date   • Endometriosis    • Gestational hypertension    • Migraine    • Ovarian cyst        No Known Allergies    Past Surgical History:   Procedure Laterality Date   •  SECTION N/A 2020    Procedure:  SECTION PRIMARY;  Surgeon: Elvira Sampson MD;  Location: [facility] LABOR DELIVERY;  Service: Obstetrics;  Laterality: N/A;   • DIAGNOSTIC LAPAROSCOPY         Family History   Problem Relation Age of Onset   • Diabetes Father    • Hypertension Father    • Mitral valve prolapse Mother    • Mitral valve prolapse Brother    • Lung cancer Maternal Grandfather        Social History     Socioeconomic History   • Marital status:    Tobacco Use   • Smoking status: Never Smoker   • Smokeless tobacco: Never Used   Substance and Sexual Activity   • Alcohol use: Yes     Comment: OCCASIONAL-prior to pregnancy   • Drug use: No   • Sexual activity: Yes     Partners: Male     Birth control/protection: None           Objective   Physical Exam  Vitals and nursing note reviewed.   Constitutional:       General: She is not in acute distress.     Appearance: Normal appearance. She is  not ill-appearing.   HENT:      Head: Normocephalic and atraumatic.      Mouth/Throat:      Mouth: Mucous membranes are moist.   Eyes:      General: No scleral icterus.        Right eye: No discharge.         Left eye: No discharge.      Conjunctiva/sclera: Conjunctivae normal.   Cardiovascular:      Rate and Rhythm: Normal rate and regular rhythm.      Heart sounds: No murmur heard.  Pulmonary:      Effort: Pulmonary effort is normal. No respiratory distress.      Breath sounds: Normal breath sounds. No wheezing.   Abdominal:      General: Bowel sounds are normal. There is no distension.      Palpations: Abdomen is soft.      Tenderness: There is no abdominal tenderness. There is no guarding or rebound.   Musculoskeletal:         General: No swelling. Normal range of motion.      Cervical back: Normal range of motion and neck supple.   Skin:     General: Skin is warm and dry.      Findings: No rash.   Neurological:      General: No focal deficit present.      Mental Status: She is alert. Mental status is at baseline.   Psychiatric:         Mood and Affect: Mood normal.         Behavior: Behavior normal.         Thought Content: Thought content normal.         Procedures           ED Course         Labs benign. Exam benign. Afebrile.  Better with meds, feels ready to go home.  Plans to call Margo's office in the morning.    Patient stable on serial rechecks.  Discussed findings, concerns, plan of care, expected course, reasons to return and followup.  Provided the opportunity to ask questions.                            EDUARDO reviewed by Zach Mercado MD       MDM  Number of Diagnoses or Management Options     Amount and/or Complexity of Data Reviewed  Clinical lab tests: reviewed and ordered        Final diagnoses:   Endometriosis   Diffuse abdominal pain       ED Disposition  ED Disposition     ED Disposition   Discharge    Condition   Stable    Comment   --             Dariel Aragon MD  [address]  [place] [id number]  [phone]    Call today           Medication List      New Prescriptions    diclofenac 75 MG EC tablet  Commonly known as: VOLTAREN  Take 1 tablet by mouth 2 (Two) Times a Day.     ondansetron ODT 4 MG disintegrating tablet  Commonly known as: ZOFRAN-ODT  Place 1 tablet on the tongue Every 6 (Six) Hours As Needed for Nausea or Vomiting.        Changed    * oxyCODONE-acetaminophen 5-325 MG per tablet  Commonly known as: Percocet  Take 1 tablet by mouth Every 4 (Four) Hours As Needed for Severe Pain  What changed: Another medication with the same name was added. Make sure you understand how and when to take each.     * oxyCODONE-acetaminophen 7.5-325 MG per tablet  Commonly known as: PERCOCET  Take 1 tablet by mouth Every 6 (Six) Hours As Needed for Severe Pain .  What changed: You were already taking a medication with the same name, and this prescription was added. Make sure you understand how and when to take each.         * This list has 2 medication(s) that are the same as other medications prescribed for you. Read the directions carefully, and ask your doctor or other care provider to review them with you.               Where to Get Your Medications      These medications were sent to [id number] [place], [place] - [address] - [phone] [facility] [phone]   [address], [place] [address]    Phone: [phone]   · diclofenac 75 MG EC tablet  · ondansetron ODT 4 MG disintegrating tablet  · oxyCODONE-acetaminophen 7.5-325 MG per tablet          Zach Mercado MD  07/19/22 6865

## 2022-07-20 ENCOUNTER — OFFICE VISIT (OUTPATIENT)
Dept: OBSTETRICS AND GYNECOLOGY | Facility: CLINIC | Age: 32
End: 2022-07-20

## 2022-07-20 VITALS
SYSTOLIC BLOOD PRESSURE: 130 MMHG | DIASTOLIC BLOOD PRESSURE: 72 MMHG | WEIGHT: 166 LBS | BODY MASS INDEX: 30.55 KG/M2 | HEIGHT: 62 IN

## 2022-07-20 DIAGNOSIS — Z09 POSTOPERATIVE FOLLOW-UP: ICD-10-CM

## 2022-07-20 DIAGNOSIS — R10.2 PELVIC PAIN: Primary | ICD-10-CM

## 2022-07-20 LAB
B-HCG UR QL: NEGATIVE
EXPIRATION DATE: 0
INTERNAL NEGATIVE CONTROL: NORMAL
INTERNAL POSITIVE CONTROL: NORMAL
Lab: 0
WET PREP GENITAL: NEGATIVE

## 2022-07-20 PROCEDURE — 81025 URINE PREGNANCY TEST: CPT | Performed by: NURSE PRACTITIONER

## 2022-07-20 PROCEDURE — 99024 POSTOP FOLLOW-UP VISIT: CPT | Performed by: NURSE PRACTITIONER

## 2022-07-20 NOTE — PROGRESS NOTES
"     OBGYN Postoperative Exam Note          Subjective   Chief Complaint   Patient presents with   • Endometriosis     Anette Apple is a 32 y.o. year old  presenting to be seen for her post-operative visit. She is S/P Diagnostic Laproscopy with laser  on 06/15/2022 with OP at [facility] for chronic pelvic pain, hx of endometriosis.  She began having pain this past weekend, approx 5 days ago.  It was generalized and radiating to her shoulders.  On 22, the pain became much more significant, moderate to severe.  She went to  ER.  Urine and blood work were all wnl.  They gave her Rx NSAID, oxycodone and she was told to FU with OP.       She denies fever, odor, itching, dysuria, bowel changes, abn bleeding.      She has a 2 1/2 yr old child and is considering trying to conceive soon.  Her current pain is a deterrent.     OTHER THINGS SHE WANTS TO DISCUSS TODAY:      Current Outpatient Medications:   •  ibuprofen (ADVIL,MOTRIN) 600 MG tablet, Take 1 tablet by mouth Every 6 (Six) Hours As Needed for Moderate Pain ., Disp: 25 tablet, Rfl: 2  •  ZOLMitriptan (ZOMIG PO), Take  by mouth Daily As Needed., Disp: , Rfl:   •  Drospirenone (Slynd) 4 MG tablet, Take 1 tablet by mouth Daily., Disp: 28 tablet, Rfl: 11     Past Medical History:   Diagnosis Date   • Endometriosis    • Gestational hypertension    • Migraine    • Ovarian cyst         Past Surgical History:   Procedure Laterality Date   •  SECTION N/A 2020    Procedure:  SECTION PRIMARY;  Surgeon: Elvira Sampson MD;  Location: [facility] LABOR DELIVERY;  Service: Obstetrics;  Laterality: N/A;   • DIAGNOSTIC LAPAROSCOPY         The following portions of the patient's history were reviewed and updated as appropriate:current medications and allergies    Review of Systems       Objective   /72   Ht 157.5 cm (62\")   Wt 75.3 kg (166 lb)   LMP 2022   BMI 30.36 kg/m²     Physical Exam  Vitals and nursing " note reviewed.   Constitutional:       General: She is not in acute distress.     Appearance: Normal appearance. She is not ill-appearing.   Pulmonary:      Effort: Pulmonary effort is normal.   Abdominal:      General: There is no distension.      Palpations: Abdomen is soft. There is no mass.      Tenderness: There is no abdominal tenderness. There is no guarding or rebound.      Hernia: No hernia is present.   Skin:     General: Skin is warm and dry.   Neurological:      Mental Status: She is alert and oriented to person, place, and time.   Psychiatric:         Mood and Affect: Mood normal.         Behavior: Behavior normal.              Assessment   1. S/P dx lap with laser of endometriosis  2. Post op pain     Plan   D/w pt CCUA, UPT, and US all wnl.  Info on Mirena given a reviewed.  She declines appt with OP this week.  She will call to schedule.           Hiral Tse, APRN  07/20/2022

## 2022-07-26 ENCOUNTER — OFFICE VISIT (OUTPATIENT)
Dept: OBSTETRICS AND GYNECOLOGY | Facility: CLINIC | Age: 32
End: 2022-07-26

## 2022-07-26 VITALS
HEIGHT: 62 IN | SYSTOLIC BLOOD PRESSURE: 120 MMHG | BODY MASS INDEX: 30.36 KG/M2 | WEIGHT: 165 LBS | DIASTOLIC BLOOD PRESSURE: 78 MMHG

## 2022-07-26 DIAGNOSIS — Z09 POSTOPERATIVE FOLLOW-UP: Primary | ICD-10-CM

## 2022-07-26 PROCEDURE — 99212 OFFICE O/P EST SF 10 MIN: CPT | Performed by: OBSTETRICS & GYNECOLOGY

## 2022-07-26 NOTE — PROGRESS NOTES
"Post-Op Visit    Chief Complaint   Patient presents with   • post op       HPI  Anette Apple is a 32 y.o. female who returns for a post-operative follow-up visit after undergoing laparoscopy for laser  of endometriosis on 06/07/2022.      Since the patient was last seen, she reports no problems with eating, bowel movements, voiding, or wound drainage and pain is well controlled.    She states since the procedure, she has shown improvement in their activity level.    The additional following portions of the patient's history were reviewed and updated as appropriate: allergies and current medications.    Review of Systems    I have reviewed and agree with the HPI, ROS, and historical information as entered above. Dariel Aragon MD    Objective   /78   Ht 157.5 cm (62\")   Wt 74.8 kg (165 lb)   LMP 06/29/2022   BMI 30.18 kg/m²     Physical Exam not done     Assessment & Plan     Assessment     Problem List Items Addressed This Visit    None     Visit Diagnoses     Postoperative follow-up    -  Primary            Plan:  No orders of the defined types were placed in this encounter.      Instructions:  1. Ok until 3 weeks post op and had pain and ER was needed , had minimal endomertriosis and we will stay on OCP until poss preg or IUD .       Dariel Aragon MD   "

## (undated) DEVICE — PK C/SECT 10

## (undated) DEVICE — SUT PLAIN  3/0 CT1 27IN 842H

## (undated) DEVICE — SUT PROLN 2/0 PC3 8833H

## (undated) DEVICE — SKIN AFFIX SURG ADHESIVE 72/CS 0.55ML: Brand: MEDLINE

## (undated) DEVICE — COATED VICRYL  (POLYGLACTIN 910) SUTURE, VIOLET BRAIDED, STERILE, SYNTHETIC ABSORBABLE SUTURE: Brand: COATED VICRYL

## (undated) DEVICE — SUT VIC 2/0 CT1 27IN J339H BX/36

## (undated) DEVICE — SOL IRR NACL 0.9PCT BT 1000ML

## (undated) DEVICE — GLV SURG SENSICARE W/ALOE PF LF 7.5 STRL

## (undated) DEVICE — TRY SPINE BLCK WHITACRE 25G 3X5IN

## (undated) DEVICE — MAT PREVALON MOBL TRANSFR AIR WO/PAD 39X80IN

## (undated) DEVICE — SOL IRR H2O BTL 1000ML STRL